# Patient Record
Sex: FEMALE | Race: BLACK OR AFRICAN AMERICAN | Employment: UNEMPLOYED | URBAN - METROPOLITAN AREA
[De-identification: names, ages, dates, MRNs, and addresses within clinical notes are randomized per-mention and may not be internally consistent; named-entity substitution may affect disease eponyms.]

---

## 2018-03-11 ENCOUNTER — APPOINTMENT (OUTPATIENT)
Dept: CT IMAGING | Age: 58
End: 2018-03-11
Payer: COMMERCIAL

## 2018-03-11 ENCOUNTER — HOSPITAL ENCOUNTER (EMERGENCY)
Age: 58
Discharge: HOME | End: 2018-03-11
Attending: EMERGENCY MEDICINE
Payer: COMMERCIAL

## 2018-03-11 VITALS
WEIGHT: 186.5 LBS | RESPIRATION RATE: 14 BRPM | SYSTOLIC BLOOD PRESSURE: 134 MMHG | BODY MASS INDEX: 28.26 KG/M2 | HEIGHT: 68 IN | OXYGEN SATURATION: 100 % | DIASTOLIC BLOOD PRESSURE: 73 MMHG | HEART RATE: 70 BPM | TEMPERATURE: 97.6 F

## 2018-03-11 DIAGNOSIS — R47.9 SPEECH DISTURBANCE, UNSPECIFIED TYPE: ICD-10-CM

## 2018-03-11 DIAGNOSIS — R20.2 TINGLING SENSATION: Primary | ICD-10-CM

## 2018-03-11 LAB
% CKMB: 0.9 % (ref 0–3)
ABSOLUTE EOS #: 0.1 K/UL (ref 0–0.4)
ABSOLUTE IMMATURE GRANULOCYTE: ABNORMAL K/UL (ref 0–0.3)
ABSOLUTE LYMPH #: 1.8 K/UL (ref 1–4.8)
ABSOLUTE MONO #: 0.4 K/UL (ref 0.2–0.8)
ALBUMIN SERPL-MCNC: 4.2 G/DL (ref 3.5–5.2)
ALBUMIN/GLOBULIN RATIO: ABNORMAL (ref 1–2.5)
ALP BLD-CCNC: 92 U/L (ref 35–104)
ALT SERPL-CCNC: 17 U/L (ref 5–33)
AMMONIA: 44 UMOL/L (ref 11–51)
ANION GAP SERPL CALCULATED.3IONS-SCNC: 10 MMOL/L (ref 9–17)
AST SERPL-CCNC: 23 U/L
BASOPHILS # BLD: 0 % (ref 0–2)
BASOPHILS ABSOLUTE: 0 K/UL (ref 0–0.2)
BILIRUB SERPL-MCNC: <0.1 MG/DL (ref 0.3–1.2)
BILIRUBIN DIRECT: <0.08 MG/DL
BILIRUBIN, INDIRECT: ABNORMAL MG/DL (ref 0–1)
BUN BLDV-MCNC: 16 MG/DL (ref 6–20)
BUN/CREAT BLD: 17 (ref 9–20)
CALCIUM SERPL-MCNC: 9.9 MG/DL (ref 8.6–10.4)
CHLORIDE BLD-SCNC: 96 MMOL/L (ref 98–107)
CK MB: 2.2 NG/ML
CKMB INTERPRETATION: ABNORMAL
CO2: 29 MMOL/L (ref 20–31)
CREAT SERPL-MCNC: 0.94 MG/DL (ref 0.5–0.9)
DIFFERENTIAL TYPE: ABNORMAL
EOSINOPHILS RELATIVE PERCENT: 1 % (ref 1–4)
GFR AFRICAN AMERICAN: >60 ML/MIN
GFR NON-AFRICAN AMERICAN: >60 ML/MIN
GFR SERPL CREATININE-BSD FRML MDRD: ABNORMAL ML/MIN/{1.73_M2}
GFR SERPL CREATININE-BSD FRML MDRD: ABNORMAL ML/MIN/{1.73_M2}
GLOBULIN: ABNORMAL G/DL (ref 1.5–3.8)
GLUCOSE BLD-MCNC: 119 MG/DL (ref 70–99)
HCT VFR BLD CALC: 38.4 % (ref 36–46)
HEMOGLOBIN: 12.6 G/DL (ref 12–16)
IMMATURE GRANULOCYTES: ABNORMAL %
LYMPHOCYTES # BLD: 26 % (ref 24–44)
MAGNESIUM: 2 MG/DL (ref 1.6–2.6)
MCH RBC QN AUTO: 30.9 PG (ref 26–34)
MCHC RBC AUTO-ENTMCNC: 32.7 G/DL (ref 31–37)
MCV RBC AUTO: 94.4 FL (ref 80–100)
MONOCYTES # BLD: 5 % (ref 1–7)
MYOGLOBIN: 118 NG/ML (ref 25–58)
NRBC AUTOMATED: ABNORMAL PER 100 WBC
PDW BLD-RTO: 13.8 % (ref 11.5–14.5)
PLATELET # BLD: 210 K/UL (ref 130–400)
PLATELET ESTIMATE: ABNORMAL
PMV BLD AUTO: 9.3 FL (ref 6–12)
POTASSIUM SERPL-SCNC: 3.9 MMOL/L (ref 3.7–5.3)
RBC # BLD: 4.07 M/UL (ref 4–5.2)
RBC # BLD: ABNORMAL 10*6/UL
SEG NEUTROPHILS: 68 % (ref 36–66)
SEGMENTED NEUTROPHILS ABSOLUTE COUNT: 4.5 K/UL (ref 1.8–7.7)
SODIUM BLD-SCNC: 135 MMOL/L (ref 135–144)
TOTAL CK: 233 U/L (ref 26–192)
TOTAL PROTEIN: 7.5 G/DL (ref 6.4–8.3)
TROPONIN INTERP: ABNORMAL
TROPONIN T: <0.03 NG/ML
TSH SERPL DL<=0.05 MIU/L-ACNC: 1.06 MIU/L (ref 0.3–5)
WBC # BLD: 6.7 K/UL (ref 3.5–11)
WBC # BLD: ABNORMAL 10*3/UL

## 2018-03-11 PROCEDURE — 70450 CT HEAD/BRAIN W/O DYE: CPT

## 2018-03-11 PROCEDURE — 84443 ASSAY THYROID STIM HORMONE: CPT

## 2018-03-11 PROCEDURE — 84484 ASSAY OF TROPONIN QUANT: CPT

## 2018-03-11 PROCEDURE — 82140 ASSAY OF AMMONIA: CPT

## 2018-03-11 PROCEDURE — 99285 EMERGENCY DEPT VISIT HI MDM: CPT

## 2018-03-11 PROCEDURE — 80307 DRUG TEST PRSMV CHEM ANLYZR: CPT

## 2018-03-11 PROCEDURE — 80076 HEPATIC FUNCTION PANEL: CPT

## 2018-03-11 PROCEDURE — 83874 ASSAY OF MYOGLOBIN: CPT

## 2018-03-11 PROCEDURE — 83735 ASSAY OF MAGNESIUM: CPT

## 2018-03-11 PROCEDURE — 85025 COMPLETE CBC W/AUTO DIFF WBC: CPT

## 2018-03-11 PROCEDURE — 82553 CREATINE MB FRACTION: CPT

## 2018-03-11 PROCEDURE — 80048 BASIC METABOLIC PNL TOTAL CA: CPT

## 2018-03-11 PROCEDURE — 82550 ASSAY OF CK (CPK): CPT

## 2018-03-11 RX ORDER — CLONIDINE HYDROCHLORIDE 0.1 MG/1
0.1 TABLET ORAL 2 TIMES DAILY
COMMUNITY

## 2018-03-11 RX ORDER — MAGNESIUM 30 MG
30 TABLET ORAL 2 TIMES DAILY
COMMUNITY

## 2018-03-11 ASSESSMENT — ENCOUNTER SYMPTOMS
ABDOMINAL PAIN: 0
COLOR CHANGE: 0
FACIAL SWELLING: 0
EYE DISCHARGE: 0
DIARRHEA: 0
CONSTIPATION: 0
EYE REDNESS: 0
VOMITING: 0
SHORTNESS OF BREATH: 0
COUGH: 0

## 2018-03-11 NOTE — ED PROVIDER NOTES
[facility] ED  eMERGENCY dEPARTMENT eNCOUnter      Pt Name: Dar Brito  MRN: [id number]  [place] 1960  Date of evaluation: 3/11/2018  Provider: Ej Saldivar MD    [address] [place] [address]       Chief Complaint   Patient presents with    Speech Problem     on and off for over a week    Other     pressure  in ears, head, hot flashes         HISTORY OF PRESENT ILLNESS  (Location/Symptom, Timing/Onset, Context/Setting, Quality, Duration, Modifying Factors, Severity.)   Dar Brito is a 59 y.o. female who presents to the emergency department For several complaints. She states that she has had \"creepy crawlies\" on the sides of her head and sometimes she feels like she can't talk. She denies headache or chest pain or palpitations. No fever. The symptoms have been intermittent for the last month. No thoughts of harming herself. She feels like she has pressure years and saw her doctor twice about that issue. Nursing Notes were reviewed. ALLERGIES     Patient has no known allergies. CURRENT MEDICATIONS       Previous Medications    CLONIDINE (CATAPRES) 0.1 MG TABLET    Take 0.1 mg by mouth 2 times daily    NAPROXEN (NAPROSYN) 500 MG TABLET    Take 1 tablet by mouth 2 times daily as needed for Pain       PAST MEDICAL HISTORY         Diagnosis Date    Back pain     Bowel obstruction        SURGICAL HISTORY           Procedure Laterality Date    HAND SURGERY Left 01/2017    PARATHYROIDECTOMY      SPINE SURGERY      BENIGNO AND BSO Left     THYROIDECTOMY, PARTIAL           FAMILY HISTORY     History reviewed. No pertinent family history. No family status information on file. SOCIAL HISTORY      reports that she has never smoked. She has never used smokeless tobacco. She reports that she does not drink alcohol or use drugs. REVIEW OF SYSTEMS    (2-9 systems for level 4, 10 or more for level 5)     Review of Systems   Constitutional: Negative for chills, fatigue and fever. HENT: Positive for ear pain. Negative for congestion, ear discharge and facial swelling. Eyes: Negative for discharge and redness. Respiratory: Negative for cough and shortness of breath. Cardiovascular: Negative for chest pain. Gastrointestinal: Negative for abdominal pain, constipation, diarrhea and vomiting. Genitourinary: Negative for dysuria and hematuria. Musculoskeletal: Negative for arthralgias. Skin: Negative for color change and rash. Neurological: Positive for headaches. Negative for dizziness, tremors, seizures, syncope, weakness and numbness. Hematological: Negative for adenopathy. Psychiatric/Behavioral: Negative for confusion. The patient is not nervous/anxious. Except as noted above the remainder of the review of systems was reviewed and negative. PHYSICAL EXAM    (up to 7 for level 4, 8 or more for level 5)     Vitals:    03/11/18 1711   BP: 133/70   Pulse: 93   Resp: 16   Temp: 97.6 °F (36.4 °C)   TempSrc: Oral   SpO2: 100%   Weight: 186 lb 8 oz (84.6 kg)   Height: 5' 8\" (1.727 m)       Physical Exam   Constitutional: She is oriented to person, place, and time. She appears well-developed and well-nourished. No distress. HENT:   Head: Normocephalic and atraumatic. Eyes: Right eye exhibits no discharge. Left eye exhibits no discharge. No scleral icterus. Neck: Neck supple. Cardiovascular: Normal rate and regular rhythm. Pulmonary/Chest: Effort normal and breath sounds normal. No stridor. No respiratory distress. She has no wheezes. She has no rales. Abdominal: Soft. She exhibits no distension. There is no tenderness. Musculoskeletal: Normal range of motion. Lymphadenopathy:     She has no cervical adenopathy. Neurological: She is alert and oriented to person, place, and time. Skin: Skin is warm and dry. No rash noted. She is not diaphoretic. No erythema. Psychiatric: She has a normal mood and affect.  Her behavior is normal.   Vitals

## 2018-03-11 NOTE — ED TRIAGE NOTES
Here today with pain that started in her right ear. Noticed a lot of pressure in her lower face and jaw. \"There's just some words that I cannot pronounce correctly\". Headaches and posterior neck pain. \"My floaters in my eyes have gotten worse over the last week\". \"Creepy crawling sensation to right side of face\" noted per pt. No neuro deficits noted- tongue midline upon protrusion, no arm drift noted, strong equal hand grasps and DPF. Speech clear, but pt feels speech is different to her at times. \"Jusat certain words I can't say\".

## 2018-03-15 ENCOUNTER — OFFICE VISIT (OUTPATIENT)
Dept: FAMILY MEDICINE CLINIC | Age: 58
End: 2018-03-15
Payer: COMMERCIAL

## 2018-03-15 VITALS
OXYGEN SATURATION: 93 % | BODY MASS INDEX: 28.46 KG/M2 | WEIGHT: 187.8 LBS | SYSTOLIC BLOOD PRESSURE: 124 MMHG | DIASTOLIC BLOOD PRESSURE: 74 MMHG | TEMPERATURE: 98.5 F | HEART RATE: 92 BPM | RESPIRATION RATE: 16 BRPM | HEIGHT: 68 IN

## 2018-03-15 DIAGNOSIS — M54.2 NECK PAIN OF OVER 3 MONTHS DURATION: Primary | ICD-10-CM

## 2018-03-15 PROCEDURE — G8484 FLU IMMUNIZE NO ADMIN: HCPCS | Performed by: FAMILY MEDICINE

## 2018-03-15 PROCEDURE — G8419 CALC BMI OUT NRM PARAM NOF/U: HCPCS | Performed by: FAMILY MEDICINE

## 2018-03-15 PROCEDURE — 99203 OFFICE O/P NEW LOW 30 MIN: CPT | Performed by: FAMILY MEDICINE

## 2018-03-15 PROCEDURE — G8427 DOCREV CUR MEDS BY ELIG CLIN: HCPCS | Performed by: FAMILY MEDICINE

## 2018-03-15 PROCEDURE — 1036F TOBACCO NON-USER: CPT | Performed by: FAMILY MEDICINE

## 2018-03-15 PROCEDURE — 3017F COLORECTAL CA SCREEN DOC REV: CPT | Performed by: FAMILY MEDICINE

## 2018-03-15 PROCEDURE — G0444 DEPRESSION SCREEN ANNUAL: HCPCS | Performed by: FAMILY MEDICINE

## 2018-03-15 PROCEDURE — 3014F SCREEN MAMMO DOC REV: CPT | Performed by: FAMILY MEDICINE

## 2018-03-15 ASSESSMENT — PATIENT HEALTH QUESTIONNAIRE - PHQ9
4. FEELING TIRED OR HAVING LITTLE ENERGY: 0
5. POOR APPETITE OR OVEREATING: 0
SUM OF ALL RESPONSES TO PHQ QUESTIONS 1-9: 11
6. FEELING BAD ABOUT YOURSELF - OR THAT YOU ARE A FAILURE OR HAVE LET YOURSELF OR YOUR FAMILY DOWN: 3
7. TROUBLE CONCENTRATING ON THINGS, SUCH AS READING THE NEWSPAPER OR WATCHING TELEVISION: 2
8. MOVING OR SPEAKING SO SLOWLY THAT OTHER PEOPLE COULD HAVE NOTICED. OR THE OPPOSITE, BEING SO FIGETY OR RESTLESS THAT YOU HAVE BEEN MOVING AROUND A LOT MORE THAN USUAL: 0
3. TROUBLE FALLING OR STAYING ASLEEP: 3
10. IF YOU CHECKED OFF ANY PROBLEMS, HOW DIFFICULT HAVE THESE PROBLEMS MADE IT FOR YOU TO DO YOUR WORK, TAKE CARE OF THINGS AT HOME, OR GET ALONG WITH OTHER PEOPLE: 0
9. THOUGHTS THAT YOU WOULD BE BETTER OFF DEAD, OR OF HURTING YOURSELF: 0
1. LITTLE INTEREST OR PLEASURE IN DOING THINGS: 0
SUM OF ALL RESPONSES TO PHQ9 QUESTIONS 1 & 2: 3
2. FEELING DOWN, DEPRESSED OR HOPELESS: 3

## 2018-03-15 NOTE — PROGRESS NOTES
Subjective:       Beth Triana is a 59 y.o. female and is here for a comprehensive physical exam.  The patient reports problems - : This is a 13-year-old female who is coming in and sitting there at chair very fatigued. She tells me that she has a lot of problems and she doesn't know which is start. Has problems neck and back and more anxiety sleep problems. Neck pain and back pain: for years. The recent pain started in her ear, pressure all in her jw area and has problems prouncing words tingling but it feels that this is all in her neck. Then the ED doc says to her it is all in her head. Is just hard to descripe and all pressure in her neck and all neck problems and her R arm hurts and her R hand is numb and she fells like she falls apart. She was in [facility] all last year. When coming back her doc was in [place] and she was cut off all her meds and since then she fells \"awful\". She had a doc in [place] and then she was called and then she was discharged. But everybody tells her it is in her head. She tries to avoid the pain management spec and she needs anxiety meds to make her sleep she had not slept in 2-3 night. Now she took 2 [place] and she only slept for 2 hrs. Was on xanax and valium before. She tried gabapentin and Cymbalta and lyrica and gabapentin. She took trazodone but did not tolerate it. Feels since being back in [place]. She feels like \" somebody is going to get her\". Feels very depressed. It is like a \"cross road\". \"it was the biggest mistake to come back here. This place will never going to change, you are stuck here. It is awful. \"    Review of Systems  Do you have pain that bothers you in your daily life? yes  Review of Systems   Constitutional: Negative for fever and unexpected weight change.      Objective:   HENT:   /74   Pulse 92   Temp 98.5 °F (36.9 °C)   Resp 16   Ht 5' 8\" (1.727 m)   Wt 187 lb 12.8 oz (85.2 kg)   SpO2 93%   BMI 28.55 kg/m²   Constitutional: Alert and oriented. Well-nourished. Head: Normocephalic and atraumatic. [place] was seen today for new patient. Diagnoses and all orders for this visit:    Neck pain of over 3 months duration  -     XR CERVICAL SPINE (2-3 VIEWS); Future    Other orders  -     Cancel: CBC Auto Differential; Future  -     Cancel: Comprehensive Metabolic Panel; Future  -     Cancel: Urinalysis; Future  -     Cancel: TSH without Reflex; Future  -     Cancel: T4, Free; Future  -     Cancel: Lipid Panel; Future    Patient was seen with total face to face time of 30 minutes. More than 50% of this visit was counseling and education regarding pain medication sleep medication. I discussed with her in length that I won't prescribe any benzodiazepine or any pain medications or her. She needs to see pain management. She needs to see psychiatry. And then again patient states that she needs to have something  to sleep and she has all this pain and she goes on and goes on. That she is depressed, but she does not feel suicidal.  And then leaving the office. She just walks out and seems quite happy. Cussed with her can prescribe amitriptyline and trazodone. Seroquel for sleep. I won't prescribe anything else. Recommended for her to find a different physician who can prescribe all the meds she needs. Follow up as needed for acute illness      Call or return to clinic prn if these symptoms worsen or fail to improve as anticipated. I have reviewed the instructions with the patient, answering all questions to her satisfaction.       (Please note that portions of this note were completed with a voice recognition program. Efforts were made to edit the dictations but occasionally words are mis-transcribed.)

## 2018-06-18 ENCOUNTER — OFFICE VISIT (OUTPATIENT)
Dept: FAMILY MEDICINE CLINIC | Age: 58
End: 2018-06-18
Payer: COMMERCIAL

## 2018-06-18 ENCOUNTER — HOSPITAL ENCOUNTER (EMERGENCY)
Age: 58
Discharge: HOME | End: 2018-06-18
Attending: EMERGENCY MEDICINE
Payer: COMMERCIAL

## 2018-06-18 VITALS
HEART RATE: 99 BPM | DIASTOLIC BLOOD PRESSURE: 85 MMHG | RESPIRATION RATE: 16 BRPM | SYSTOLIC BLOOD PRESSURE: 141 MMHG | OXYGEN SATURATION: 97 %

## 2018-06-18 VITALS
OXYGEN SATURATION: 100 % | SYSTOLIC BLOOD PRESSURE: 144 MMHG | TEMPERATURE: 98.2 F | HEIGHT: 68 IN | RESPIRATION RATE: 18 BRPM | DIASTOLIC BLOOD PRESSURE: 84 MMHG | BODY MASS INDEX: 27.28 KG/M2 | HEART RATE: 97 BPM | WEIGHT: 180 LBS

## 2018-06-18 DIAGNOSIS — R20.2 NUMBNESS AND TINGLING: Primary | ICD-10-CM

## 2018-06-18 DIAGNOSIS — R20.0 NUMBNESS AND TINGLING: Primary | ICD-10-CM

## 2018-06-18 DIAGNOSIS — M79.2 NEUROPATHIC PAIN: Primary | ICD-10-CM

## 2018-06-18 LAB
ABSOLUTE EOS #: 0 K/UL (ref 0–0.4)
ABSOLUTE IMMATURE GRANULOCYTE: ABNORMAL K/UL (ref 0–0.3)
ABSOLUTE LYMPH #: 2.3 K/UL (ref 1–4.8)
ABSOLUTE MONO #: 0.5 K/UL (ref 0.2–0.8)
ANION GAP SERPL CALCULATED.3IONS-SCNC: 14 MMOL/L (ref 9–17)
BASOPHILS # BLD: 0 % (ref 0–2)
BASOPHILS ABSOLUTE: 0 K/UL (ref 0–0.2)
BUN BLDV-MCNC: 15 MG/DL (ref 6–20)
BUN/CREAT BLD: 18 (ref 9–20)
CALCIUM SERPL-MCNC: 10.1 MG/DL (ref 8.6–10.4)
CHLORIDE BLD-SCNC: 100 MMOL/L (ref 98–107)
CO2: 26 MMOL/L (ref 20–31)
CREAT SERPL-MCNC: 0.84 MG/DL (ref 0.5–0.9)
DIFFERENTIAL TYPE: ABNORMAL
EOSINOPHILS RELATIVE PERCENT: 1 % (ref 1–4)
GFR AFRICAN AMERICAN: >60 ML/MIN
GFR NON-AFRICAN AMERICAN: >60 ML/MIN
GFR SERPL CREATININE-BSD FRML MDRD: NORMAL ML/MIN/{1.73_M2}
GFR SERPL CREATININE-BSD FRML MDRD: NORMAL ML/MIN/{1.73_M2}
GLUCOSE BLD-MCNC: 94 MG/DL (ref 70–99)
HCT VFR BLD CALC: 37.8 % (ref 36–46)
HEMOGLOBIN: 12.1 G/DL (ref 12–16)
IMMATURE GRANULOCYTES: ABNORMAL %
LYMPHOCYTES # BLD: 29 % (ref 24–44)
MAGNESIUM: 1.9 MG/DL (ref 1.6–2.6)
MCH RBC QN AUTO: 30.4 PG (ref 26–34)
MCHC RBC AUTO-ENTMCNC: 32.1 G/DL (ref 31–37)
MCV RBC AUTO: 94.7 FL (ref 80–100)
MONOCYTES # BLD: 6 % (ref 1–7)
NRBC AUTOMATED: ABNORMAL PER 100 WBC
PDW BLD-RTO: 14.8 % (ref 11.5–14.5)
PLATELET # BLD: 190 K/UL (ref 130–400)
PLATELET ESTIMATE: ABNORMAL
PMV BLD AUTO: 9.5 FL (ref 6–12)
POTASSIUM SERPL-SCNC: 3.9 MMOL/L (ref 3.7–5.3)
RBC # BLD: 3.99 M/UL (ref 4–5.2)
RBC # BLD: ABNORMAL 10*6/UL
SEG NEUTROPHILS: 64 % (ref 36–66)
SEGMENTED NEUTROPHILS ABSOLUTE COUNT: 5 K/UL (ref 1.8–7.7)
SODIUM BLD-SCNC: 140 MMOL/L (ref 135–144)
WBC # BLD: 7.9 K/UL (ref 3.5–11)
WBC # BLD: ABNORMAL 10*3/UL

## 2018-06-18 PROCEDURE — 85025 COMPLETE CBC W/AUTO DIFF WBC: CPT

## 2018-06-18 PROCEDURE — 83735 ASSAY OF MAGNESIUM: CPT

## 2018-06-18 PROCEDURE — 80048 BASIC METABOLIC PNL TOTAL CA: CPT

## 2018-06-18 PROCEDURE — 99284 EMERGENCY DEPT VISIT MOD MDM: CPT

## 2018-06-18 PROCEDURE — 99212 OFFICE O/P EST SF 10 MIN: CPT | Performed by: FAMILY MEDICINE

## 2018-06-18 RX ORDER — FLUOXETINE HYDROCHLORIDE 20 MG/1
40 CAPSULE ORAL DAILY
COMMUNITY
End: 2018-08-21 | Stop reason: SDUPTHER

## 2018-06-18 RX ORDER — GABAPENTIN 300 MG/1
300 CAPSULE ORAL 2 TIMES DAILY
Qty: 20 CAPSULE | Refills: 0 | Status: SHIPPED | OUTPATIENT
Start: 2018-06-18 | End: 2019-04-17

## 2018-06-18 ASSESSMENT — PAIN SCALES - GENERAL: PAINLEVEL_OUTOF10: 5

## 2018-06-18 ASSESSMENT — PAIN DESCRIPTION - LOCATION: LOCATION: NECK

## 2018-07-26 ENCOUNTER — HOSPITAL ENCOUNTER (OUTPATIENT)
Dept: CT IMAGING | Age: 58
Discharge: HOME | End: 2018-07-28
Payer: COMMERCIAL

## 2018-07-26 DIAGNOSIS — M54.2 CERVICALGIA: ICD-10-CM

## 2018-07-26 PROCEDURE — 6360000004 HC RX CONTRAST MEDICATION: Performed by: FAMILY MEDICINE

## 2018-07-26 PROCEDURE — 70491 CT SOFT TISSUE NECK W/DYE: CPT

## 2018-07-26 PROCEDURE — 2580000003 HC RX 258: Performed by: FAMILY MEDICINE

## 2018-07-26 RX ORDER — 0.9 % SODIUM CHLORIDE 0.9 %
50 INTRAVENOUS SOLUTION INTRAVENOUS ONCE
Status: COMPLETED | OUTPATIENT
Start: 2018-07-26 | End: 2018-07-26

## 2018-07-26 RX ORDER — SODIUM CHLORIDE 0.9 % (FLUSH) 0.9 %
10 SYRINGE (ML) INJECTION PRN
Status: DISCONTINUED | OUTPATIENT
Start: 2018-07-26 | End: 2018-07-29 | Stop reason: HOSPADM

## 2018-07-26 RX ADMIN — IOPAMIDOL 75 ML: 755 INJECTION, SOLUTION INTRAVENOUS at 16:43

## 2018-07-26 RX ADMIN — SODIUM CHLORIDE 50 ML: 9 INJECTION, SOLUTION INTRAVENOUS at 16:44

## 2018-07-26 RX ADMIN — Medication 10 ML: at 16:44

## 2018-08-21 ENCOUNTER — OFFICE VISIT (OUTPATIENT)
Dept: FAMILY MEDICINE CLINIC | Age: 58
End: 2018-08-21
Payer: COMMERCIAL

## 2018-08-21 VITALS
OXYGEN SATURATION: 99 % | SYSTOLIC BLOOD PRESSURE: 140 MMHG | RESPIRATION RATE: 16 BRPM | HEART RATE: 89 BPM | DIASTOLIC BLOOD PRESSURE: 92 MMHG

## 2018-08-21 DIAGNOSIS — G89.4 CHRONIC PAIN SYNDROME: Primary | ICD-10-CM

## 2018-08-21 DIAGNOSIS — R93.7 ABNORMAL CT SCAN, CERVICAL SPINE: ICD-10-CM

## 2018-08-21 PROCEDURE — 1036F TOBACCO NON-USER: CPT | Performed by: FAMILY MEDICINE

## 2018-08-21 PROCEDURE — G8419 CALC BMI OUT NRM PARAM NOF/U: HCPCS | Performed by: FAMILY MEDICINE

## 2018-08-21 PROCEDURE — 3017F COLORECTAL CA SCREEN DOC REV: CPT | Performed by: FAMILY MEDICINE

## 2018-08-21 PROCEDURE — G8427 DOCREV CUR MEDS BY ELIG CLIN: HCPCS | Performed by: FAMILY MEDICINE

## 2018-08-21 PROCEDURE — 99213 OFFICE O/P EST LOW 20 MIN: CPT | Performed by: FAMILY MEDICINE

## 2018-08-21 RX ORDER — FLUOXETINE HYDROCHLORIDE 20 MG/1
40 CAPSULE ORAL DAILY
Qty: 30 CAPSULE | Refills: 3 | Status: SHIPPED | OUTPATIENT
Start: 2018-08-21 | End: 2018-11-29 | Stop reason: ALTCHOICE
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
## 2018-09-27 ENCOUNTER — HOSPITAL ENCOUNTER (OUTPATIENT)
Dept: MRI IMAGING | Age: 58
Discharge: HOME | End: 2018-09-29
Payer: COMMERCIAL

## 2018-09-27 DIAGNOSIS — H92.02 OTALGIA, LEFT EAR: ICD-10-CM

## 2018-09-27 DIAGNOSIS — M50.30 OTHER CERVICAL DISC DEGENERATION, UNSPECIFIED CERVICAL REGION: ICD-10-CM

## 2018-09-27 DIAGNOSIS — M54.2 CERVICALGIA: ICD-10-CM

## 2018-09-27 PROCEDURE — 72141 MRI NECK SPINE W/O DYE: CPT

## 2018-10-15 ENCOUNTER — OFFICE VISIT (OUTPATIENT)
Dept: FAMILY MEDICINE CLINIC | Age: 58
End: 2018-10-15
Payer: COMMERCIAL

## 2018-10-15 VITALS
TEMPERATURE: 97.2 F | WEIGHT: 180 LBS | DIASTOLIC BLOOD PRESSURE: 79 MMHG | HEIGHT: 68 IN | BODY MASS INDEX: 27.28 KG/M2 | HEART RATE: 80 BPM | RESPIRATION RATE: 16 BRPM | SYSTOLIC BLOOD PRESSURE: 128 MMHG

## 2018-10-15 DIAGNOSIS — M51.36 DEGENERATIVE DISC DISEASE, LUMBAR: ICD-10-CM

## 2018-10-15 DIAGNOSIS — R05.9 COUGH: Primary | ICD-10-CM

## 2018-10-15 DIAGNOSIS — J30.2 SEASONAL ALLERGIES: ICD-10-CM

## 2018-10-15 DIAGNOSIS — Z23 NEED FOR INFLUENZA VACCINATION: ICD-10-CM

## 2018-10-15 PROCEDURE — 3017F COLORECTAL CA SCREEN DOC REV: CPT | Performed by: FAMILY MEDICINE

## 2018-10-15 PROCEDURE — 1036F TOBACCO NON-USER: CPT | Performed by: FAMILY MEDICINE

## 2018-10-15 PROCEDURE — G8427 DOCREV CUR MEDS BY ELIG CLIN: HCPCS | Performed by: FAMILY MEDICINE

## 2018-10-15 PROCEDURE — G8482 FLU IMMUNIZE ORDER/ADMIN: HCPCS | Performed by: FAMILY MEDICINE

## 2018-10-15 PROCEDURE — G8419 CALC BMI OUT NRM PARAM NOF/U: HCPCS | Performed by: FAMILY MEDICINE

## 2018-10-15 PROCEDURE — 99214 OFFICE O/P EST MOD 30 MIN: CPT | Performed by: FAMILY MEDICINE

## 2018-10-15 RX ORDER — FLUTICASONE PROPIONATE 50 MCG
1 SPRAY, SUSPENSION (ML) NASAL DAILY
Qty: 1 BOTTLE | Refills: 3 | Status: SHIPPED
Start: 2018-10-15 | End: 2020-03-03 | Stop reason: ALTCHOICE

## 2018-10-15 RX ORDER — MONTELUKAST SODIUM 10 MG/1
10 TABLET ORAL NIGHTLY
Qty: 30 TABLET | Refills: 3 | Status: SHIPPED | OUTPATIENT
Start: 2018-10-15

## 2018-10-15 RX ORDER — LEVOCETIRIZINE DIHYDROCHLORIDE 5 MG/1
5 TABLET, FILM COATED ORAL NIGHTLY
Qty: 30 TABLET | Refills: 2 | Status: SHIPPED | OUTPATIENT
Start: 2018-10-15

## 2018-10-15 RX ORDER — ALBUTEROL SULFATE 90 UG/1
2 AEROSOL, METERED RESPIRATORY (INHALATION) EVERY 4 HOURS PRN
Qty: 1 INHALER | Refills: 0 | Status: SHIPPED | OUTPATIENT
Start: 2018-10-15 | End: 2021-04-22

## 2018-10-15 NOTE — PROGRESS NOTES
Subjective:       Anshul Elliott is a 59 y.o. female who presents for evaluation of nasal congestion and nonproductive cough. Symptoms began several weeks ago. Symptoms have been unchanged since that time. Past history is significant for allergies. The patient feels that all the nasal congestion. The cough is probably because of her allergies. And then again she starts to talk about her pain and her pain is so bad\". This is awful\". She has pain and nobody helps her. She also tells me that she had a granuloma in her left lung and she has increased pain there. The patient was referred to pain specialist just couple month ago and she did not see a back from him yet, so she wants to have another referral.  Again, she cannot believe that she has to suffer because of other people who missed use the pain medications. Past Medical History:   Diagnosis Date    Back pain     Bowel obstruction (HCC)     Depression      There are no active problems to display for this patient. Past Surgical History:   Procedure Laterality Date    HAND SURGERY Left 01/2017    PARATHYROIDECTOMY      SPINE SURGERY      BENIGNO AND BSO Left     THYROIDECTOMY, PARTIAL      WRIST FRACTURE SURGERY       No family history on file.   Social History     Social History    Marital status:      Spouse name: N/A    Number of children: N/A    Years of education: N/A     Social History Main Topics    Smoking status: Never Smoker    Smokeless tobacco: Never Used    Alcohol use No    Drug use: No    Sexual activity: Not Asked     Other Topics Concern    None     Social History Narrative    None     Current Outpatient Prescriptions   Medication Sig Dispense Refill    FLUoxetine (PROZAC) 20 MG capsule Take 2 capsules by mouth daily 30 capsule 3    cloNIDine (CATAPRES) 0.1 MG tablet Take 0.1 mg by mouth 2 times daily      magnesium 30 MG tablet Take 30 mg by mouth 2 times daily      naproxen (NAPROSYN) 500 MG tablet Take 1 tablet by mouth 2 times daily as needed for Pain 60 tablet 0    gabapentin (NEURONTIN) 300 MG capsule Take 1 capsule by mouth 2 times daily for 10 days. . 20 capsule 0     No current facility-administered medications for this visit. Current Outpatient Prescriptions on File Prior to Visit   Medication Sig Dispense Refill    FLUoxetine (PROZAC) 20 MG capsule Take 2 capsules by mouth daily 30 capsule 3    cloNIDine (CATAPRES) 0.1 MG tablet Take 0.1 mg by mouth 2 times daily      magnesium 30 MG tablet Take 30 mg by mouth 2 times daily      naproxen (NAPROSYN) 500 MG tablet Take 1 tablet by mouth 2 times daily as needed for Pain 60 tablet 0    gabapentin (NEURONTIN) 300 MG capsule Take 1 capsule by mouth 2 times daily for 10 days. . 20 capsule 0     No current facility-administered medications on file prior to visit. No Known Allergies    Review of Systems  Pertinent items are noted in HPI. Objective:      /79   Pulse 80   Temp 97.2 °F (36.2 °C) (Oral)   Resp 16   Ht 5' 8\" (1.727 m)   Wt 180 lb (81.6 kg)   BMI 27.37 kg/m²   General appearance: alert, appears stated age, cooperative and no distress  Lungs: clear to auscultation bilaterally  Heart: regular rate and rhythm, S1, S2 normal, no murmur, click, rub or gallop      Luke Mendoza was seen today for cough and wheezing. Diagnoses and all orders for this visit:    Cough  -     XR CHEST STANDARD (2 VW); Future  -     albuterol sulfate HFA (VENTOLIN HFA) 108 (90 Base) MCG/ACT inhaler; Inhale 2 puffs into the lungs every 4 hours as needed for Wheezing    Seasonal allergies  -     montelukast (SINGULAIR) 10 MG tablet; Take 1 tablet by mouth nightly  -     fluticasone (FLONASE) 50 MCG/ACT nasal spray; 1 spray by Each Nare route daily  -     levocetirizine (XYZAL) 5 MG tablet; Take 1 tablet by mouth nightly  -     albuterol sulfate HFA (VENTOLIN HFA) 108 (90 Base) MCG/ACT inhaler;  Inhale 2 puffs into the lungs every 4 hours as needed for

## 2018-10-16 PROCEDURE — 90686 IIV4 VACC NO PRSV 0.5 ML IM: CPT | Performed by: FAMILY MEDICINE

## 2018-10-16 PROCEDURE — 90471 IMMUNIZATION ADMIN: CPT | Performed by: FAMILY MEDICINE

## 2018-11-29 ENCOUNTER — HOSPITAL ENCOUNTER (OUTPATIENT)
Age: 58
Setting detail: SPECIMEN
Discharge: HOME | End: 2018-11-29
Payer: COMMERCIAL

## 2018-11-29 ENCOUNTER — OFFICE VISIT (OUTPATIENT)
Dept: FAMILY MEDICINE CLINIC | Age: 58
End: 2018-11-29
Payer: COMMERCIAL

## 2018-11-29 VITALS
WEIGHT: 186 LBS | SYSTOLIC BLOOD PRESSURE: 125 MMHG | TEMPERATURE: 97.7 F | HEART RATE: 97 BPM | DIASTOLIC BLOOD PRESSURE: 89 MMHG | OXYGEN SATURATION: 99 % | BODY MASS INDEX: 28.28 KG/M2

## 2018-11-29 DIAGNOSIS — R30.0 DYSURIA: ICD-10-CM

## 2018-11-29 DIAGNOSIS — M25.561 RECURRENT PAIN OF RIGHT KNEE: Primary | ICD-10-CM

## 2018-11-29 DIAGNOSIS — R45.89 DEPRESSED MOOD: ICD-10-CM

## 2018-11-29 LAB
BILIRUBIN, POC: ABNORMAL
BLOOD URINE, POC: ABNORMAL
CLARITY, POC: ABNORMAL
COLOR, POC: YELLOW
GLUCOSE URINE, POC: ABNORMAL
KETONES, POC: ABNORMAL
LEUKOCYTE EST, POC: ABNORMAL
NITRITE, POC: ABNORMAL
PH, POC: 7
PROTEIN, POC: ABNORMAL
SPECIFIC GRAVITY, POC: 1.01
UROBILINOGEN, POC: 0.2

## 2018-11-29 PROCEDURE — G8427 DOCREV CUR MEDS BY ELIG CLIN: HCPCS | Performed by: FAMILY MEDICINE

## 2018-11-29 PROCEDURE — G8482 FLU IMMUNIZE ORDER/ADMIN: HCPCS | Performed by: FAMILY MEDICINE

## 2018-11-29 PROCEDURE — G8419 CALC BMI OUT NRM PARAM NOF/U: HCPCS | Performed by: FAMILY MEDICINE

## 2018-11-29 PROCEDURE — 1036F TOBACCO NON-USER: CPT | Performed by: FAMILY MEDICINE

## 2018-11-29 PROCEDURE — 3017F COLORECTAL CA SCREEN DOC REV: CPT | Performed by: FAMILY MEDICINE

## 2018-11-29 PROCEDURE — 81002 URINALYSIS NONAUTO W/O SCOPE: CPT | Performed by: FAMILY MEDICINE

## 2018-11-29 PROCEDURE — 99214 OFFICE O/P EST MOD 30 MIN: CPT | Performed by: FAMILY MEDICINE

## 2018-11-29 RX ORDER — NITROFURANTOIN 25; 75 MG/1; MG/1
100 CAPSULE ORAL 2 TIMES DAILY
Qty: 10 CAPSULE | Refills: 0 | Status: SHIPPED | OUTPATIENT
Start: 2018-11-29 | End: 2018-12-04

## 2018-11-29 RX ORDER — FLUVOXAMINE MALEATE 100 MG/1
100 CAPSULE, EXTENDED RELEASE ORAL NIGHTLY
Qty: 30 CAPSULE | Refills: 1 | Status: SHIPPED | OUTPATIENT
Start: 2018-11-29 | End: 2019-01-29 | Stop reason: SDUPTHER

## 2018-11-29 NOTE — PATIENT INSTRUCTIONS
extended-release capsule. Swallow it whole. You should not stop using fluvoxamine suddenly. Follow your doctor's instructions about tapering your dose. Store at room temperature away from moisture and heat. Keep the bottle tightly closed when not in use. What happens if I miss a dose? Take the missed dose as soon as you remember. Skip the missed dose if it is almost time for your next scheduled dose. Do not take extra medicine to make up the missed dose. What happens if I overdose? Seek emergency medical attention or call the Poison Help line at 1-621.829.8787. What should I avoid while taking fluvoxamine? Drinking alcohol can increase certain side effects of fluvoxamine. Ask your doctor before taking a nonsteroidal anti-inflammatory drug (NSAID) for pain, arthritis, fever, or swelling. This includes aspirin, ibuprofen (Advil, Motrin), naproxen (Aleve), celecoxib (Celebrex), diclofenac, indomethacin, meloxicam, and others. Using an NSAID with fluvoxamine may cause you to bruise or bleed easily. Fluvoxamine may impair your thinking or reactions. Avoid driving or operating machinery until you know how fluvoxamine will affect you. What are the possible side effects of fluvoxamine? Get emergency medical help if you have signs of an allergic reaction: skin rash, blisters, or hives; fever, joint pain; difficult breathing; swelling of your face, lips, tongue, or throat. Report any new or worsening symptoms to your doctor, such as: mood or behavior changes, anxiety, panic attacks, trouble sleeping, or if you feel impulsive, irritable, agitated, hostile, aggressive, restless, hyperactive (mentally or physically), more depressed, or have thoughts about suicide or hurting yourself.   Call your doctor at once if you have;  · anxiety, racing thoughts, risk-taking behavior, sleep problems (insomnia), feelings of extreme happiness or irritability;  · blurred vision, tunnel vision, eye pain or swelling, or seeing halos

## 2018-11-29 NOTE — LETTER
[facility]  [address] [address]  Phone: [phone]    Ramón Maloney MD        December 3, 2018     [address] [facility] [place] [facility]      Dear Rima Rehman:    Below are the results from your recent visit:    Resulted Orders   Urine Culture   Result Value Ref Range    Specimen Description . CLEAN CATCH URINE     Special Requests NOT REPORTED     Culture NO GROWTH     Status FINAL 11/30/2018      Normal - no UTI. If you have any questions or concerns, please don't hesitate to call.     Sincerely,        Ramón Maloney MD

## 2018-11-29 NOTE — PROGRESS NOTES
General FM note    Teri Sparrow is a 59 y.o. female who presents today for follow up on her  medical conditions as noted below. Teri Sparrow is c/o of   Chief Complaint   Patient presents with    Dysuria       There is no problem list on file for this patient. Past Medical History:   Diagnosis Date    Back pain     Bowel obstruction (HCC)     Depression       Past Surgical History:   Procedure Laterality Date    HAND SURGERY Left 01/2017    PARATHYROIDECTOMY      SPINE SURGERY      BENIGNO AND BSO Left     THYROIDECTOMY, PARTIAL      WRIST FRACTURE SURGERY       No family history on file. Current Outpatient Prescriptions   Medication Sig Dispense Refill    nitrofurantoin, macrocrystal-monohydrate, (MACROBID) 100 MG capsule Take 1 capsule by mouth 2 times daily for 5 days 10 capsule 0    fluvoxaMINE (LUVOX CR) 100 MG CP24 extended release capsule Take 100 mg by mouth nightly 30 capsule 1    montelukast (SINGULAIR) 10 MG tablet Take 1 tablet by mouth nightly 30 tablet 3    fluticasone (FLONASE) 50 MCG/ACT nasal spray 1 spray by Each Nare route daily 1 Bottle 3    levocetirizine (XYZAL) 5 MG tablet Take 1 tablet by mouth nightly 30 tablet 2    albuterol sulfate HFA (VENTOLIN HFA) 108 (90 Base) MCG/ACT inhaler Inhale 2 puffs into the lungs every 4 hours as needed for Wheezing 1 Inhaler 0    gabapentin (NEURONTIN) 300 MG capsule Take 1 capsule by mouth 2 times daily for 10 days. . 20 capsule 0    cloNIDine (CATAPRES) 0.1 MG tablet Take 0.1 mg by mouth 2 times daily      magnesium 30 MG tablet Take 30 mg by mouth 2 times daily      naproxen (NAPROSYN) 500 MG tablet Take 1 tablet by mouth 2 times daily as needed for Pain 60 tablet 0     No current facility-administered medications for this visit.       ALLERGIES:  No Known Allergies    Social History   Substance Use Topics    Smoking status: Never Smoker    Smokeless tobacco: Never Used    Alcohol use No      Body mass index is distress. Eyes: normal conjunctiva and lids. CAV: RRR, no RMG. No edema lower extremities. Pulmo: CTA bilateral, no CWR. Skin: no rashes, lesions or ulcers. Abdomen: Soft. Bowel sounds positive. Tenderness especially at the suprapubic area. I did not appreciate any flank pains. Right knee. Tenderness to lateral ligaments. Normal state range of motion. Musculoskeletal: normal gait. Nails: no clubbing or cyanosis. Psychiatric: alert and oriented to place, time and person. Normal mood and affect. Assessment:       Diagnosis Orders   1. Recurrent pain of right knee  XR KNEE LEFT (3 VIEWS)   2. Dysuria  Urine Culture    POCT Urinalysis Dipstick (No Micro)   3. Depressed mood         Plan:   X-ray done. Patient does not want to see physical therapy right now. macrobid called in. Due to her symptoms. Await culture. The such a patient from Prozac to Luvox. Hopefully this will help her. Call or return to clinic prn if these symptoms worsen or fail to improve as anticipated. I have reviewed the instructions with the patient, answering all questions to her satisfaction. Return in about 4 weeks (around 12/27/2018), or if symptoms worsen or fail to improve, for Anxiety, MD.  Orders Placed This Encounter   Procedures    Urine Culture     Standing Status:   Future     Standing Expiration Date:   11/29/2019     Order Specific Question:   Specify (ex-cath, midstream, cysto, etc)?      Answer:   midstream    XR KNEE LEFT (3 VIEWS)     Standing Status:   Future     Standing Expiration Date:   11/29/2019    POCT Urinalysis Dipstick (No Micro)     Orders Placed This Encounter   Medications    nitrofurantoin, macrocrystal-monohydrate, (MACROBID) 100 MG capsule     Sig: Take 1 capsule by mouth 2 times daily for 5 days     Dispense:  10 capsule     Refill:  0    fluvoxaMINE (LUVOX CR) 100 MG CP24 extended release capsule     Sig: Take 100 mg by mouth nightly     Dispense:  30 capsule     Refill:  1

## 2018-11-30 LAB
CULTURE: NO GROWTH
Lab: NORMAL
SPECIMEN DESCRIPTION: NORMAL
STATUS: NORMAL

## 2018-12-19 ENCOUNTER — TELEPHONE (OUTPATIENT)
Dept: FAMILY MEDICINE CLINIC | Age: 58
End: 2018-12-19

## 2018-12-19 DIAGNOSIS — R05.9 COUGH: ICD-10-CM

## 2018-12-20 ENCOUNTER — OFFICE VISIT (OUTPATIENT)
Dept: FAMILY MEDICINE CLINIC | Age: 58
End: 2018-12-20
Payer: COMMERCIAL

## 2018-12-20 VITALS
OXYGEN SATURATION: 91 % | DIASTOLIC BLOOD PRESSURE: 80 MMHG | TEMPERATURE: 97.9 F | HEART RATE: 100 BPM | SYSTOLIC BLOOD PRESSURE: 136 MMHG

## 2018-12-20 DIAGNOSIS — J98.4 SCARRING OF LUNG: Primary | ICD-10-CM

## 2018-12-20 DIAGNOSIS — R05.3 PERSISTENT COUGH FOR 3 WEEKS OR LONGER: ICD-10-CM

## 2018-12-20 PROCEDURE — 3017F COLORECTAL CA SCREEN DOC REV: CPT | Performed by: FAMILY MEDICINE

## 2018-12-20 PROCEDURE — G8419 CALC BMI OUT NRM PARAM NOF/U: HCPCS | Performed by: FAMILY MEDICINE

## 2018-12-20 PROCEDURE — G8427 DOCREV CUR MEDS BY ELIG CLIN: HCPCS | Performed by: FAMILY MEDICINE

## 2018-12-20 PROCEDURE — 99213 OFFICE O/P EST LOW 20 MIN: CPT | Performed by: FAMILY MEDICINE

## 2018-12-20 PROCEDURE — G8482 FLU IMMUNIZE ORDER/ADMIN: HCPCS | Performed by: FAMILY MEDICINE

## 2018-12-20 PROCEDURE — 1036F TOBACCO NON-USER: CPT | Performed by: FAMILY MEDICINE

## 2018-12-20 RX ORDER — PREDNISONE 20 MG/1
20 TABLET ORAL DAILY
Qty: 5 TABLET | Refills: 0 | Status: SHIPPED | OUTPATIENT
Start: 2018-12-20 | End: 2018-12-25

## 2018-12-20 RX ORDER — CLARITHROMYCIN 500 MG/1
500 TABLET, COATED ORAL 2 TIMES DAILY
Qty: 20 TABLET | Refills: 0 | Status: SHIPPED | OUTPATIENT
Start: 2018-12-20 | End: 2018-12-30

## 2018-12-20 ASSESSMENT — PATIENT HEALTH QUESTIONNAIRE - PHQ9
1. LITTLE INTEREST OR PLEASURE IN DOING THINGS: 1
SUM OF ALL RESPONSES TO PHQ QUESTIONS 1-9: 2
2. FEELING DOWN, DEPRESSED OR HOPELESS: 1
SUM OF ALL RESPONSES TO PHQ9 QUESTIONS 1 & 2: 2
SUM OF ALL RESPONSES TO PHQ QUESTIONS 1-9: 2

## 2019-01-29 RX ORDER — FLUVOXAMINE MALEATE 100 MG/1
100 CAPSULE, EXTENDED RELEASE ORAL NIGHTLY
Qty: 30 CAPSULE | Refills: 1 | Status: SHIPPED | OUTPATIENT
Start: 2019-01-29 | End: 2019-04-01 | Stop reason: SDUPTHER

## 2019-02-06 ENCOUNTER — OFFICE VISIT (OUTPATIENT)
Dept: PULMONOLOGY | Age: 59
End: 2019-02-06
Payer: COMMERCIAL

## 2019-02-06 VITALS
HEIGHT: 68 IN | BODY MASS INDEX: 29.7 KG/M2 | SYSTOLIC BLOOD PRESSURE: 145 MMHG | OXYGEN SATURATION: 99 % | RESPIRATION RATE: 20 BRPM | WEIGHT: 196 LBS | DIASTOLIC BLOOD PRESSURE: 76 MMHG | HEART RATE: 93 BPM

## 2019-02-06 VITALS — HEART RATE: 93 BPM | WEIGHT: 196 LBS | OXYGEN SATURATION: 99 % | BODY MASS INDEX: 29.7 KG/M2 | HEIGHT: 68 IN

## 2019-02-06 DIAGNOSIS — J98.4 SCARRING OF LUNG: Primary | ICD-10-CM

## 2019-02-06 DIAGNOSIS — R91.1 LUNG NODULE SEEN ON IMAGING STUDY: ICD-10-CM

## 2019-02-06 PROCEDURE — G8427 DOCREV CUR MEDS BY ELIG CLIN: HCPCS | Performed by: INTERNAL MEDICINE

## 2019-02-06 PROCEDURE — 94729 DIFFUSING CAPACITY: CPT | Performed by: INTERNAL MEDICINE

## 2019-02-06 PROCEDURE — G8482 FLU IMMUNIZE ORDER/ADMIN: HCPCS | Performed by: INTERNAL MEDICINE

## 2019-02-06 PROCEDURE — 3017F COLORECTAL CA SCREEN DOC REV: CPT | Performed by: INTERNAL MEDICINE

## 2019-02-06 PROCEDURE — 94726 PLETHYSMOGRAPHY LUNG VOLUMES: CPT | Performed by: INTERNAL MEDICINE

## 2019-02-06 PROCEDURE — 99243 OFF/OP CNSLTJ NEW/EST LOW 30: CPT | Performed by: INTERNAL MEDICINE

## 2019-02-06 PROCEDURE — G8419 CALC BMI OUT NRM PARAM NOF/U: HCPCS | Performed by: INTERNAL MEDICINE

## 2019-02-06 PROCEDURE — 94060 EVALUATION OF WHEEZING: CPT | Performed by: INTERNAL MEDICINE

## 2019-04-01 RX ORDER — FLUVOXAMINE MALEATE 100 MG/1
100 CAPSULE, EXTENDED RELEASE ORAL NIGHTLY
Qty: 30 CAPSULE | Refills: 1 | Status: SHIPPED | OUTPATIENT
Start: 2019-04-01 | End: 2019-04-02 | Stop reason: SDUPTHER

## 2019-04-02 ENCOUNTER — OFFICE VISIT (OUTPATIENT)
Dept: FAMILY MEDICINE CLINIC | Age: 59
End: 2019-04-02
Payer: COMMERCIAL

## 2019-04-02 VITALS
DIASTOLIC BLOOD PRESSURE: 71 MMHG | HEART RATE: 97 BPM | TEMPERATURE: 97.8 F | OXYGEN SATURATION: 93 % | SYSTOLIC BLOOD PRESSURE: 114 MMHG

## 2019-04-02 DIAGNOSIS — M54.50 ACUTE RIGHT-SIDED LOW BACK PAIN WITHOUT SCIATICA: Primary | ICD-10-CM

## 2019-04-02 DIAGNOSIS — N39.3 STRESS INCONTINENCE OF URINE: ICD-10-CM

## 2019-04-02 PROCEDURE — 1036F TOBACCO NON-USER: CPT | Performed by: FAMILY MEDICINE

## 2019-04-02 PROCEDURE — G8419 CALC BMI OUT NRM PARAM NOF/U: HCPCS | Performed by: FAMILY MEDICINE

## 2019-04-02 PROCEDURE — 3017F COLORECTAL CA SCREEN DOC REV: CPT | Performed by: FAMILY MEDICINE

## 2019-04-02 PROCEDURE — 99214 OFFICE O/P EST MOD 30 MIN: CPT | Performed by: FAMILY MEDICINE

## 2019-04-02 PROCEDURE — G8427 DOCREV CUR MEDS BY ELIG CLIN: HCPCS | Performed by: FAMILY MEDICINE

## 2019-04-02 RX ORDER — FLUVOXAMINE MALEATE 100 MG/1
100 CAPSULE, EXTENDED RELEASE ORAL NIGHTLY
Qty: 30 CAPSULE | Refills: 1 | Status: SHIPPED | OUTPATIENT
Start: 2019-04-02 | End: 2019-07-19 | Stop reason: SDUPTHER

## 2019-04-02 RX ORDER — OXYBUTYNIN CHLORIDE 10 MG/1
10 TABLET, EXTENDED RELEASE ORAL DAILY
Qty: 30 TABLET | Refills: 3 | Status: SHIPPED | OUTPATIENT
Start: 2019-04-02 | End: 2021-08-09

## 2019-04-02 RX ORDER — TIZANIDINE 4 MG/1
4 TABLET ORAL 3 TIMES DAILY
Qty: 30 TABLET | Refills: 0 | Status: SHIPPED | OUTPATIENT
Start: 2019-04-02

## 2019-04-02 RX ORDER — LANSOPRAZOLE 30 MG/1
30 CAPSULE, DELAYED RELEASE ORAL DAILY
Qty: 30 CAPSULE | Refills: 3 | Status: SHIPPED | OUTPATIENT
Start: 2019-04-02 | End: 2020-05-22

## 2019-04-02 ASSESSMENT — PATIENT HEALTH QUESTIONNAIRE - PHQ9
2. FEELING DOWN, DEPRESSED OR HOPELESS: 0
1. LITTLE INTEREST OR PLEASURE IN DOING THINGS: 0
SUM OF ALL RESPONSES TO PHQ QUESTIONS 1-9: 0
SUM OF ALL RESPONSES TO PHQ QUESTIONS 1-9: 0
SUM OF ALL RESPONSES TO PHQ9 QUESTIONS 1 & 2: 0

## 2019-04-02 NOTE — PROGRESS NOTES
General FM note    Abdirashid Colunga is a 59 y.o. female who presents today for follow up on her  medical conditions as noted below. Abdirashid Colunga is c/o of   Chief Complaint   Patient presents with    Back Pain       Patient Active Problem List:     Depressed mood     Scarring of lung     Past Medical History:   Diagnosis Date    Back pain     Bowel obstruction ([address]     Depression       Past Surgical History:   Procedure Laterality Date    HAND SURGERY Left 01/2017    PARATHYROIDECTOMY      SPINE SURGERY      BENIGNO AND BSO Left     THYROIDECTOMY, PARTIAL      WRIST FRACTURE SURGERY       No family history on file. Current Outpatient Medications   Medication Sig Dispense Refill    oxybutynin (DITROPAN XL) 10 MG extended release tablet Take 1 tablet by mouth daily 30 tablet 3    tiZANidine (ZANAFLEX) 4 MG tablet Take 1 tablet by mouth 3 times daily 30 tablet 0    lansoprazole (PREVACID) 30 MG delayed release capsule Take 1 capsule by mouth daily 30 capsule 3    fluvoxaMINE (LUVOX CR) 100 MG CP24 extended release capsule Take 100 mg by mouth nightly 30 capsule 1    montelukast (SINGULAIR) 10 MG tablet Take 1 tablet by mouth nightly 30 tablet 3    fluticasone (FLONASE) 50 MCG/ACT nasal spray 1 spray by Each Nare route daily 1 Bottle 3    levocetirizine (XYZAL) 5 MG tablet Take 1 tablet by mouth nightly 30 tablet 2    albuterol sulfate HFA (VENTOLIN HFA) 108 (90 Base) MCG/ACT inhaler Inhale 2 puffs into the lungs every 4 hours as needed for Wheezing 1 Inhaler 0    gabapentin (NEURONTIN) 300 MG capsule Take 1 capsule by mouth 2 times daily for 10 days. . 20 capsule 0    cloNIDine (CATAPRES) 0.1 MG tablet Take 0.1 mg by mouth 2 times daily      magnesium 30 MG tablet Take 30 mg by mouth 2 times daily      naproxen (NAPROSYN) 500 MG tablet Take 1 tablet by mouth 2 times daily as needed for Pain 60 tablet 0     No current facility-administered medications for this visit.       ALLERGIES:  No

## 2019-04-02 NOTE — PROGRESS NOTES
Visit Information    Have you changed or started any medications since your last visit including any over-the-counter medicines, vitamins, or herbal medicines? no   Are you having any side effects from any of your medications? -  no  Have you stopped taking any of your medications? Is so, why? -  no    Have you seen any other physician or provider since your last visit? No  Have you had any other diagnostic tests since your last visit? No  Have you been seen in the emergency room and/or had an admission to a hospital since we last saw you? No  Have you had your routine dental cleaning in the past 6 months? no    Have you activated your [facility] account? If not, what are your barriers?  No     Patient Care Team:  Rivera Amin MD as PCP - General (Family Medicine)  Rosemarie Bradford MD as Consulting Physician (Pulmonology)    Medical History Review  Past Medical, Family, and Social History reviewed and does not contribute to the patient presenting condition    Health Maintenance   Topic Date Due    Hepatitis C screen  1960    HIV screen  08/05/1973    DTaP/Tdap/Td vaccine (1 - Tdap) 08/05/1977    Lipid screen  08/05/1998    Breast cancer screen  08/05/2008    Shingles Vaccine (1 of 2) 08/05/2008    Colon cancer screen colonoscopy  08/05/2008    Flu vaccine  Completed

## 2019-04-17 ENCOUNTER — HOSPITAL ENCOUNTER (OUTPATIENT)
Dept: PAIN MANAGEMENT | Age: 59
Discharge: HOME | End: 2019-04-17
Payer: COMMERCIAL

## 2019-04-17 VITALS
HEART RATE: 77 BPM | DIASTOLIC BLOOD PRESSURE: 78 MMHG | TEMPERATURE: 98.7 F | BODY MASS INDEX: 29.07 KG/M2 | SYSTOLIC BLOOD PRESSURE: 125 MMHG | WEIGHT: 191.8 LBS | RESPIRATION RATE: 16 BRPM | HEIGHT: 68 IN | OXYGEN SATURATION: 98 %

## 2019-04-17 DIAGNOSIS — M51.36 DDD (DEGENERATIVE DISC DISEASE), LUMBAR: ICD-10-CM

## 2019-04-17 DIAGNOSIS — Z98.1 STATUS POST LUMBAR SPINAL FUSION: ICD-10-CM

## 2019-04-17 DIAGNOSIS — M47.817 LUMBOSACRAL SPONDYLOSIS WITHOUT MYELOPATHY: ICD-10-CM

## 2019-04-17 DIAGNOSIS — R45.89 DEPRESSED MOOD: Primary | ICD-10-CM

## 2019-04-17 PROCEDURE — 80307 DRUG TEST PRSMV CHEM ANLYZR: CPT

## 2019-04-17 PROCEDURE — 99203 OFFICE O/P NEW LOW 30 MIN: CPT

## 2019-04-17 ASSESSMENT — ENCOUNTER SYMPTOMS
EYE PAIN: 0
COUGH: 0
VOMITING: 0
ALLERGIC/IMMUNOLOGIC NEGATIVE: 1
RESPIRATORY NEGATIVE: 1
BACK PAIN: 1
CHOKING: 0
WHEEZING: 0
SINUS PRESSURE: 0
CONSTIPATION: 1
ABDOMINAL PAIN: 0
NAUSEA: 0
EYES NEGATIVE: 1
PHOTOPHOBIA: 0
BOWEL INCONTINENCE: 0

## 2019-04-17 ASSESSMENT — PAIN DESCRIPTION - ORIENTATION: ORIENTATION: LOWER

## 2019-04-17 ASSESSMENT — PAIN DESCRIPTION - PAIN TYPE: TYPE: CHRONIC PAIN

## 2019-04-17 ASSESSMENT — PAIN SCALES - GENERAL: PAINLEVEL_OUTOF10: 7

## 2019-04-17 ASSESSMENT — PAIN DESCRIPTION - DIRECTION: RADIATING_TOWARDS: NUMBNESS IN FEET

## 2019-04-17 ASSESSMENT — PAIN DESCRIPTION - ONSET: ONSET: ON-GOING

## 2019-04-17 ASSESSMENT — PAIN DESCRIPTION - FREQUENCY: FREQUENCY: CONTINUOUS

## 2019-04-17 ASSESSMENT — PAIN DESCRIPTION - PROGRESSION: CLINICAL_PROGRESSION: GRADUALLY WORSENING

## 2019-04-17 NOTE — PROGRESS NOTES
[facility] Pain Management  Patient Pain Assessment  Consultation - Dr. Yasmany Cannon    Primary Care Physician: Mariaa Rios MD    Chief complaint:   Chief Complaint   Patient presents with    Lower Back Pain   . HISTORY OF PRESENT ILLNESS:  Presley Sales is 59 y.o. female referred to the pain clinic in consultation for back pain by Bhavya Guan MD    Patient is a 78-year-old female who is referred to the pain clinic with a history of low back pain since 2004. Patient reports she needed 2 back surgeries in 2004 but continued to have pain. According to the patient her surgeon wanted to do another surgery but patient went to [facility] and was told not to have any further surgeries. Apparently she has been to [place] [facility]. She did physical therapy in the past. Patient is a poor historian and is very difficult to get a good history from her. She was apparently seen by different physicians and was getting oxycodone 30 mg 3 times a day. She went to [place] [id number] where she fell and fractured her left arm and had surgery done. Apparently she moved back to [place] 2 years ago. She has not been to any other physicians since then. She was referred to Dr. Galina Joseph as well as Dr. Thomas Hudson by her physician but the patient did not go. She reports she was not getting any pain medications for the last 2 years. Patient also gives history of depression. Her goal is to get better. Her pain is decreased to set next and with pain medications. Patient also gives history of for pain in the cervical region as well as in the shoulders and in the wrists. She also complains of numbness and tingling involving the hands and feet  Patient's pain in the low back also in the right knee and right calf. Back Pain   This is a chronic problem. The current episode started more than 1 year ago. The problem occurs constantly. The problem has been gradually worsening since onset.  The pain is present in the applicable  Taking more medication than prescribed:  not applicable  Are you receiving PAIN medications from  other doctors: not applicable  Last Urine/Serum Drug Screen :  Was Serum/UDS as anticipated? Will collect  Brought pill bottles in :not applicable   Was Pill count appropriate? :not applicable   Are currently pregnant? not applicable  Recent ER visits: No             Past Medical History      Diagnosis Date    Back pain     Bowel obstruction ([facility] [address]     Depression        Surgical History  Past Surgical History:   Procedure Laterality Date    HAND SURGERY Left 01/2017    PARATHYROIDECTOMY      SPINE SURGERY      BENIGNO AND BSO Left     THYROIDECTOMY, PARTIAL      WRIST FRACTURE SURGERY         Medications  Current Outpatient Medications   Medication Sig Dispense Refill    oxybutynin (DITROPAN XL) 10 MG extended release tablet Take 1 tablet by mouth daily 30 tablet 3    tiZANidine (ZANAFLEX) 4 MG tablet Take 1 tablet by mouth 3 times daily 30 tablet 0    lansoprazole (PREVACID) 30 MG delayed release capsule Take 1 capsule by mouth daily 30 capsule 3    fluvoxaMINE (LUVOX CR) 100 MG CP24 extended release capsule Take 100 mg by mouth nightly 30 capsule 1    montelukast (SINGULAIR) 10 MG tablet Take 1 tablet by mouth nightly 30 tablet 3    fluticasone (FLONASE) 50 MCG/ACT nasal spray 1 spray by Each Nare route daily 1 Bottle 3    albuterol sulfate HFA (VENTOLIN HFA) 108 (90 Base) MCG/ACT inhaler Inhale 2 puffs into the lungs every 4 hours as needed for Wheezing 1 Inhaler 0    cloNIDine (CATAPRES) 0.1 MG tablet Take 0.1 mg by mouth 2 times daily      levocetirizine (XYZAL) 5 MG tablet Take 1 tablet by mouth nightly 30 tablet 2    magnesium 30 MG tablet Take 30 mg by mouth 2 times daily       No current facility-administered medications for this encounter. Allergies  Patient has no known allergies. Family History  family history is not on file.     Social History  Social History Socioeconomic History    Marital status:      Spouse name: None    Number of children: None    Years of education: None    Highest education level: None   Occupational History    None   Social Needs    Financial resource strain: None    Food insecurity:     Worry: None     Inability: None    Transportation needs:     Medical: None     Non-medical: None   Tobacco Use    Smoking status: Never Smoker    Smokeless tobacco: Never Used   Substance and Sexual Activity    Alcohol use: No    Drug use: No    Sexual activity: None   Lifestyle    Physical activity:     Days per week: None     Minutes per session: None    Stress: None   Relationships    Social connections:     Talks on phone: None     Gets together: None     Attends Christian service: None     Active member of club or organization: None     Attends meetings of clubs or organizations: None     Relationship status: None    Intimate partner violence:     Fear of current or ex partner: None     Emotionally abused: None     Physically abused: None     Forced sexual activity: None   Other Topics Concern    None   Social History Narrative    None      reports that she does not use drugs. REVIEW OF SYSTEMS:  Review of Systems   Constitutional: Negative. Negative for activity change, appetite change, diaphoresis and fever. HENT: Negative. Negative for congestion, hearing loss, postnasal drip and sinus pressure. Eyes: Negative. Negative for photophobia, pain and visual disturbance. Respiratory: Negative. Negative for cough, choking and wheezing. Cardiovascular: Negative. Negative for chest pain and palpitations. Gastrointestinal: Positive for constipation. Negative for abdominal pain, bowel incontinence, nausea and vomiting. Endocrine: Negative. Negative for heat intolerance and polyphagia. Genitourinary: Negative. Negative for bladder incontinence, dysuria and hematuria.    Musculoskeletal: Positive for back pain and neck pain. Skin: Negative. Allergic/Immunologic: Negative. Neurological: Positive for weakness, numbness and headaches. Both knees   Hematological: Negative. Negative for adenopathy. Does not bruise/bleed easily. Psychiatric/Behavioral: Positive for sleep disturbance. Negative for confusion and suicidal ideas. The patient is nervous/anxious. Depression            GENERAL PHYSICAL EXAM:  Vitals: /78   Pulse 77   Temp 98.7 °F (37.1 °C) (Oral)   Resp 16   Ht 5' 8\" (1.727 m)   Wt 191 lb 12.8 oz (87 kg)   SpO2 98%   BMI 29.16 kg/m² , Body mass index is 29.16 kg/m². Physical Exam   Constitutional: She is oriented to person, place, and time. She appears well-developed and well-nourished. HENT:   Head: Normocephalic and atraumatic. Eyes: Pupils are equal, round, and reactive to light. Conjunctivae and EOM are normal.   Neck: Neck supple. No tracheal deviation present. No thyromegaly present. Cardiovascular: Normal rate and regular rhythm. Pulmonary/Chest: Effort normal.   Abdominal: She exhibits no distension. Musculoskeletal: Normal range of motion. Neurological: She is alert and oriented to person, place, and time. She has normal strength. She displays no atrophy, no tremor and normal reflexes. No cranial nerve deficit or sensory deficit. She displays a negative Romberg sign. Coordination normal.   Reflex Scores:       Patellar reflexes are 2+ on the right side and 2+ on the left side. Achilles reflexes are 2+ on the right side and 2+ on the left side. Skin: Skin is warm and dry. No erythema. Psychiatric: Her speech is normal and behavior is normal. Judgment and thought content normal. She exhibits a depressed mood. Right Ankle Exam     Muscle Strength   The patient has normal right ankle strength. Left Ankle Exam     Muscle Strength   The patient has normal left ankle strength.       Right Knee Exam     Muscle Strength   The patient has normal right knee strength. Left Knee Exam     Muscle Strength   The patient has normal left knee strength. Right Hip Exam     Muscle Strength   The patient has normal right hip strength. Left Hip Exam     Muscle Strength   The patient has normal left hip strength. Back Exam     Tenderness   The patient is experiencing tenderness in the lumbar and sacroiliac. Range of Motion   Back extension: Limited and painful. Back flexion: Limited and painful. Back lateral bend right: Limited and painful. Back lateral bend left: Limited and painful. Back rotation right: Limited and painful. Back rotation left: Limited and painful. Tests   Straight leg raise right: negative  Straight leg raise left: negative    Other   Sensation: normal  Gait: antalgic   Erythema: no back redness  Scars: present    Comments:   Facet loading---  : Right side--    Pain-Moderate                                   Left side----   Pain  Moderate  Sebastien's test -------------- Right side---negative                                           Left side-----negative          Nurses Notes and Vital Signs reviewed.     DATA  Labs:   6/18/2018  9:31 PM - Rolando Blair Incoming Lab Results From 2nd Watch     Component Value Ref Range & Units Status Collected Lab   Glucose 94  70 - 99 mg/dL Final 06/18/2018  8:51 PM - [facility]   BUN 15  6 - 20 mg/dL Final 06/18/2018  8:51  [facility]   CREATININE 0.84  0.50 - 0.90 mg/dL Final 06/18/2018  8:51  [facility]   Bun/Cre Ratio 18  9 - 20 Final 06/18/2018  8:51  [facility]   Calcium 10.1  8.6 - 10.4 mg/dL Final 06/18/2018  8:51  [facility]   Sodium 140  135 - 144 mmol/L Final 06/18/2018  8:51  [facility]   Potassium 3.9  3.7 - 5.3 mmol/L Final 06/18/2018  8:51  [facility]   Chloride 100  98 - 107 mmol/L Final 06/18/2018  8:51  [facility]   CO2 26  20 - 31 mmol/L Final 06/18/2018  8:51  [facility]   Anion Gap 14  9 - 17 mmol/L Final 06/18/2018  8:51  [facility]   GFR Non-African American >60  >60 mL/min Final 06/18/2018  8:51  [facility]   GFR African American >60  >60 mL/min Final 06/18/2018  8:51  [facility]   GFR Comment        Final 06/18/2018  8:51           Imaging:  Radiology Images and Reports reviewed where indicated and necessary     SPINAL CORD: No abnormal cord signal is noted.       SOFT TISSUES: No paraspinal mass identified.       C2-C3: Minimal disc bulging is noted.  Facet hypertrophic changes are noted,   right greater than left.  There is mild suggested foraminal narrowing on the   right.       C3-C4: Diffuse disc bulging is noted with diffuse endplate hypertrophic   change and bilateral facet hypertrophic change.  Moderate to severe foraminal   narrowing is noted, left greater than right.  Ventral cord flattening is   noted.  Mild narrowing of the canal is noted.       C4-C5: Diffuse disc bulging is noted.  Endplate, facet and uncovertebral   hypertrophic changes are noted.  Mild foraminal narrowing is noted   bilaterally.  Ventral cord flattening is noted.  Mild canal narrowing is noted       C5-C6: Diffuse disc bulging is noted.  Endplate, uncovertebral and facet   hypertrophic changes are noted.  Mild-to-moderate left and mild right   foraminal narrowing is noted.  Ventral cord flattening is noted.  There is   mild narrowing of the canal       C6-C7: Mild disc bulging is noted diffusely.  Endplate, uncovertebral and   facet hypertrophic changes are noted.  Moderate narrowing of the left foramen   is noted.  Left foraminal protrusion is noted.       C7-T1: Minimal disc bulging is noted.  Facet hypertrophic changes are noted.    Mild narrowing of the left foramen is suggested           Impression   Multilevel degenerative disc disease in the cervical spine as described.  See   above for details of each level       Patient Active Problem List   Diagnosis    Depressed mood    Scarring of lung        ASSESSMENT    Robert Hahn is a 59 y.o. female with     1. Depressed mood    2. DDD (degenerative disc disease), lumbar    3. Lumbosacral spondylosis without myelopathy    4. Status post lumbar spinal fusion           PLAN  . [x] No x-ray reports of lumbar spine are available at this time-patient apparently did not get her x-rays done yet    Other reports reviewed include    [] Bone scan   [] EMG and nerve conduction studies   [x] Referral reports-  I also discussed with him the following treatment options Including advantages and disadvantages of each:    [x] Physical therapy    [] Interventional pain treatment    [] Medication management    [] Surgical options    Patient's OARRS were reviewed. It is acceptable and appears patient is not receiving prescriptions from multiple prescribers. Patient is not forthcoming regarding prescriptions for pain medication in the past  Controlled Substances Monitoring: The following screens were also reviewed  ORT- the score is 1. (Values:cates patient is  <4minimal potential  4-7 Moderate potential  >7 High potential  for drug addiction  Counselling/Preventive measures for pain  Control:    [x]  Spine strengthening exercises are discussed with patient in detail. Orders Placed This Encounter   Procedures    DRUG SCREEN, PAIN     Standing Status:   Standing     Number of Occurrences:   1     Patient is not up front with me regarding her drug history. She was getting Suboxone until May 2018 she did not reveal the fact in spite of asking her a few times. She also exhibits pain behaviors. Hence at this time I'm not comfortable treating her. Patient was told that I will review the charts in her urine screen and if we change her mind then we will call her.  Otherwise she needs to be followed up with her primary care physician for further management of her pain. Decision Making Process : Patient's health history and referral records thoroughly reviewed before focused physical examination and discussion with patient. Over 50% of today's visit is spent on examining the patient and counseling. Level of complexity of date to be reviewed is Moderate. The chart date reviewed include the following: Imaging Reports. Summary of Care. Time spent reviewing with patient the below reports:   Medication safety, Treatment options. Level of diagnosis and management options of this case is multiple: involving the following management options: Interventions as needed, medication management as appropriate, future visits, activity modification, heat/ice as needed, Urine drug screen as required. [x]The patient's questions were answered to the best of my abilities. This note was created using voice recognition software. There may be inaccuracies of transcription  that are inadvertently overlooked prior to the signature. There is any questions about the transcription please contact me.     Electronically signed by Darwin Barrios MD on 4/18/2019 at 5:06 AM

## 2019-04-21 LAB
6-ACETYLMORPHINE, UR: NOT DETECTED
7-AMINOCLONAZEPAM, URINE: NOT DETECTED
ALPHA-OH-ALPRAZ, URINE: NOT DETECTED
ALPRAZOLAM, URINE: NOT DETECTED
AMPHETAMINES, URINE: NOT DETECTED
BARBITURATES, URINE: NOT DETECTED
BENZOYLECGONINE, UR: NOT DETECTED
BUPRENORPHINE URINE: NOT DETECTED
CARISOPRODOL, UR: NOT DETECTED
CLONAZEPAM, URINE: NOT DETECTED
CODEINE, URINE: NOT DETECTED
CREATININE URINE: 46.9 MG/DL (ref 20–400)
DIAZEPAM, URINE: NOT DETECTED
DRUGS EXPECTED, UR: NORMAL
EER HI RES INTERP UR: NORMAL
ETHYL GLUCURONIDE UR: NOT DETECTED
FENTANYL URINE: NOT DETECTED
HYDROCODONE, URINE: NOT DETECTED
HYDROMORPHONE, URINE: NOT DETECTED
LORAZEPAM, URINE: PRESENT
MARIJUANA METAB, UR: NOT DETECTED
MDA, UR: NOT DETECTED
MDEA, EVE, UR: NOT DETECTED
MDMA URINE: NOT DETECTED
MEPERIDINE METAB, UR: NOT DETECTED
METHADONE, URINE: NOT DETECTED
METHAMPHETAMINE, URINE: NOT DETECTED
METHYLPHENIDATE: NOT DETECTED
MIDAZOLAM, URINE: NOT DETECTED
MORPHINE URINE: NOT DETECTED
NORBUPRENORPHINE, URINE: PRESENT
NORDIAZEPAM, URINE: NOT DETECTED
NORFENTANYL, URINE: NOT DETECTED
NORHYDROCODONE, URINE: NOT DETECTED
NOROXYCODONE, URINE: NOT DETECTED
NOROXYMORPHONE, URINE: NOT DETECTED
OXAZEPAM, URINE: NOT DETECTED
OXYCODONE URINE: NOT DETECTED
OXYMORPHONE, URINE: NOT DETECTED
PAIN MANAGEMENT DRUG PANEL INTERP, URINE: NORMAL
PAIN MGT DRUG PANEL, HI RES, UR: NORMAL
PCP,URINE: NOT DETECTED
PHENTERMINE, UR: NOT DETECTED
PROPOXYPHENE, URINE: NOT DETECTED
TAPENTADOL, URINE: NOT DETECTED
TAPENTADOL-O-SULFATE, URINE: NOT DETECTED
TEMAZEPAM, URINE: NOT DETECTED
TRAMADOL, URINE: NOT DETECTED
ZOLPIDEM, URINE: NOT DETECTED

## 2019-07-19 ENCOUNTER — OFFICE VISIT (OUTPATIENT)
Dept: FAMILY MEDICINE CLINIC | Age: 59
End: 2019-07-19
Payer: COMMERCIAL

## 2019-07-19 VITALS
OXYGEN SATURATION: 100 % | DIASTOLIC BLOOD PRESSURE: 78 MMHG | TEMPERATURE: 97.9 F | HEART RATE: 92 BPM | SYSTOLIC BLOOD PRESSURE: 134 MMHG

## 2019-07-19 DIAGNOSIS — R09.81 SINUS CONGESTION: Primary | ICD-10-CM

## 2019-07-19 DIAGNOSIS — F40.9 FEAR: ICD-10-CM

## 2019-07-19 DIAGNOSIS — M25.561 ACUTE PAIN OF RIGHT KNEE: ICD-10-CM

## 2019-07-19 DIAGNOSIS — M25.511 ACUTE PAIN OF RIGHT SHOULDER: ICD-10-CM

## 2019-07-19 PROCEDURE — 99214 OFFICE O/P EST MOD 30 MIN: CPT | Performed by: FAMILY MEDICINE

## 2019-07-19 PROCEDURE — 1036F TOBACCO NON-USER: CPT | Performed by: FAMILY MEDICINE

## 2019-07-19 PROCEDURE — 3017F COLORECTAL CA SCREEN DOC REV: CPT | Performed by: FAMILY MEDICINE

## 2019-07-19 PROCEDURE — G8428 CUR MEDS NOT DOCUMENT: HCPCS | Performed by: FAMILY MEDICINE

## 2019-07-19 PROCEDURE — G8419 CALC BMI OUT NRM PARAM NOF/U: HCPCS | Performed by: FAMILY MEDICINE

## 2019-07-19 RX ORDER — FLUVOXAMINE MALEATE 100 MG/1
100 CAPSULE, EXTENDED RELEASE ORAL NIGHTLY
Qty: 30 CAPSULE | Refills: 1 | Status: SHIPPED | OUTPATIENT
Start: 2019-07-19 | End: 2019-09-17 | Stop reason: SDUPTHER

## 2019-07-19 RX ORDER — FLUOXETINE HYDROCHLORIDE 20 MG/1
40 CAPSULE ORAL DAILY
Qty: 30 CAPSULE | Refills: 3 | Status: SHIPPED | OUTPATIENT
Start: 2019-07-19 | End: 2019-07-19

## 2019-07-19 NOTE — PROGRESS NOTES
General FM note    Amara Pro is a 59 y.o. female who presents today for follow up on her  medical conditions as noted below. Amara Pro is c/o of   Chief Complaint   Patient presents with    Arm Pain    Leg Pain       Patient Active Problem List:     Depressed mood     Scarring of lung     Past Medical History:   Diagnosis Date    Back pain     Bowel obstruction ([address]     Depression       Past Surgical History:   Procedure Laterality Date    HAND SURGERY Left 01/2017    PARATHYROIDECTOMY      SPINE SURGERY      BENIGNO AND BSO Left     THYROIDECTOMY, PARTIAL      WRIST FRACTURE SURGERY       No family history on file. Current Outpatient Medications   Medication Sig Dispense Refill    fluvoxaMINE (LUVOX CR) 100 MG CP24 extended release capsule Take 100 mg by mouth nightly 30 capsule 1    oxybutynin (DITROPAN XL) 10 MG extended release tablet Take 1 tablet by mouth daily 30 tablet 3    tiZANidine (ZANAFLEX) 4 MG tablet Take 1 tablet by mouth 3 times daily 30 tablet 0    lansoprazole (PREVACID) 30 MG delayed release capsule Take 1 capsule by mouth daily 30 capsule 3    montelukast (SINGULAIR) 10 MG tablet Take 1 tablet by mouth nightly 30 tablet 3    fluticasone (FLONASE) 50 MCG/ACT nasal spray 1 spray by Each Nare route daily 1 Bottle 3    levocetirizine (XYZAL) 5 MG tablet Take 1 tablet by mouth nightly 30 tablet 2    albuterol sulfate HFA (VENTOLIN HFA) 108 (90 Base) MCG/ACT inhaler Inhale 2 puffs into the lungs every 4 hours as needed for Wheezing 1 Inhaler 0    cloNIDine (CATAPRES) 0.1 MG tablet Take 0.1 mg by mouth 2 times daily      magnesium 30 MG tablet Take 30 mg by mouth 2 times daily       No current facility-administered medications for this visit.       ALLERGIES:  No Known Allergies    Social History     Tobacco Use    Smoking status: Never Smoker    Smokeless tobacco: Never Used   Substance Use Topics    Alcohol use: No      There is no height or weight on

## 2019-07-22 ENCOUNTER — HOSPITAL ENCOUNTER (OUTPATIENT)
Age: 59
Discharge: HOME | End: 2019-07-22
Payer: COMMERCIAL

## 2019-07-22 DIAGNOSIS — R09.81 SINUS CONGESTION: ICD-10-CM

## 2019-07-22 DIAGNOSIS — F40.9 FEAR: ICD-10-CM

## 2019-07-22 DIAGNOSIS — M25.561 ACUTE PAIN OF RIGHT KNEE: ICD-10-CM

## 2019-07-22 LAB
ABSOLUTE EOS #: 0.08 K/UL (ref 0–0.44)
ABSOLUTE IMMATURE GRANULOCYTE: <0.03 K/UL (ref 0–0.3)
ABSOLUTE LYMPH #: 2.03 K/UL (ref 1.1–3.7)
ABSOLUTE MONO #: 0.4 K/UL (ref 0.1–1.2)
ALBUMIN SERPL-MCNC: 4 G/DL (ref 3.5–5.2)
ALBUMIN/GLOBULIN RATIO: 1.2 (ref 1–2.5)
ALP BLD-CCNC: 102 U/L (ref 35–104)
ALT SERPL-CCNC: 19 U/L (ref 5–33)
ANION GAP SERPL CALCULATED.3IONS-SCNC: 14 MMOL/L (ref 9–17)
AST SERPL-CCNC: 20 U/L
BASOPHILS # BLD: 1 % (ref 0–2)
BASOPHILS ABSOLUTE: 0.04 K/UL (ref 0–0.2)
BILIRUB SERPL-MCNC: <0.1 MG/DL (ref 0.3–1.2)
BUN BLDV-MCNC: 9 MG/DL (ref 8–23)
BUN/CREAT BLD: ABNORMAL (ref 9–20)
CALCIUM SERPL-MCNC: 9.5 MG/DL (ref 8.6–10.4)
CHLORIDE BLD-SCNC: 104 MMOL/L (ref 98–107)
CO2: 23 MMOL/L (ref 20–31)
CREAT SERPL-MCNC: 0.94 MG/DL (ref 0.5–0.9)
DIFFERENTIAL TYPE: NORMAL
EOSINOPHILS RELATIVE PERCENT: 1 % (ref 1–4)
GFR AFRICAN AMERICAN: >60 ML/MIN
GFR NON-AFRICAN AMERICAN: >60 ML/MIN
GFR SERPL CREATININE-BSD FRML MDRD: ABNORMAL ML/MIN/{1.73_M2}
GFR SERPL CREATININE-BSD FRML MDRD: ABNORMAL ML/MIN/{1.73_M2}
GLUCOSE BLD-MCNC: 106 MG/DL (ref 70–99)
HCT VFR BLD CALC: 39.3 % (ref 36.3–47.1)
HEMOGLOBIN: 11.9 G/DL (ref 11.9–15.1)
IMMATURE GRANULOCYTES: 0 %
LYMPHOCYTES # BLD: 32 % (ref 24–43)
MCH RBC QN AUTO: 30 PG (ref 25.2–33.5)
MCHC RBC AUTO-ENTMCNC: 30.3 G/DL (ref 28.4–34.8)
MCV RBC AUTO: 99 FL (ref 82.6–102.9)
MONOCYTES # BLD: 6 % (ref 3–12)
NRBC AUTOMATED: 0 PER 100 WBC
PDW BLD-RTO: 13.6 % (ref 11.8–14.4)
PLATELET # BLD: 199 K/UL (ref 138–453)
PLATELET ESTIMATE: NORMAL
PMV BLD AUTO: 11.9 FL (ref 8.1–13.5)
POTASSIUM SERPL-SCNC: 4.4 MMOL/L (ref 3.7–5.3)
RBC # BLD: 3.97 M/UL (ref 3.95–5.11)
RBC # BLD: NORMAL 10*6/UL
SEG NEUTROPHILS: 60 % (ref 36–65)
SEGMENTED NEUTROPHILS ABSOLUTE COUNT: 3.8 K/UL (ref 1.5–8.1)
SODIUM BLD-SCNC: 141 MMOL/L (ref 135–144)
TOTAL PROTEIN: 7.4 G/DL (ref 6.4–8.3)
WBC # BLD: 6.4 K/UL (ref 3.5–11.3)
WBC # BLD: NORMAL 10*3/UL

## 2019-07-22 PROCEDURE — 85025 COMPLETE CBC W/AUTO DIFF WBC: CPT

## 2019-07-22 PROCEDURE — 86003 ALLG SPEC IGE CRUDE XTRC EA: CPT

## 2019-07-22 PROCEDURE — 82785 ASSAY OF IGE: CPT

## 2019-07-22 PROCEDURE — 80053 COMPREHEN METABOLIC PANEL: CPT

## 2019-07-22 PROCEDURE — 36415 COLL VENOUS BLD VENIPUNCTURE: CPT

## 2019-07-24 LAB
2000687N OAK TREE IGE: <0.34 KU/L (ref 0–0.34)
ALLERGEN BERMUDA GRASS IGE: <0.34 KU/L (ref 0–0.34)
ALLERGEN BIRCH IGE: <0.34 KU/L (ref 0–0.34)
ALLERGEN COW MILK IGE: <0.34 KU/L (ref 0–0.34)
ALLERGEN DOG DANDER IGE: <0.34 KU/L (ref 0–0.34)
ALLERGEN GERMAN COCKROACH IGE: <0.34 KU/L (ref 0–0.34)
ALLERGEN HORMODENDRUM IGE: <0.34 KUL/L (ref 0–0.34)
ALLERGEN MOUSE EPITHELIA IGE: <0.34 KU/L (ref 0–0.34)
ALLERGEN PEANUT (F13) IGE: <0.34 KU/L (ref 0–0.34)
ALLERGEN PECAN TREE IGE: <0.34 KU/L (ref 0–0.34)
ALLERGEN PIGWEED ROUGH IGE: <0.34 KU/L (ref 0–0.34)
ALLERGEN SHEEP SORREL (W18) IGE: <0.34 KU/L (ref 0–0.34)
ALLERGEN TREE SYCAMORE: <0.34 KU/L (ref 0–0.34)
ALLERGEN WALNUT TREE IGE: <0.34 KU/L (ref 0–0.34)
ALLERGEN WHITE MULBERRY TREE, IGE: <0.34 KU/L (ref 0–0.34)
ALLERGEN, TREE, WHITE ASH IGE: <0.34 KU/L (ref 0–0.34)
ALTERNARIA ALTERNATA: <0.34 KU/L (ref 0–0.34)
ASPERGILLUS FUMIGATUS: <0.34 KU/L (ref 0–0.34)
CAT DANDER ANTIBODY: <0.34 KU/L (ref 0–0.34)
COTTONWOOD TREE: <0.34 KU/L (ref 0–0.34)
D. FARINAE: <0.34 KU/L (ref 0–0.34)
D. PTERONYSSINUS: <0.34 KU/L (ref 0–0.34)
ELM TREE: <0.34 KU/L (ref 0–0.34)
IGE: 48 IU/ML
MAPLE/BOXELDER TREE: <0.34 KU/L (ref 0–0.34)
MOUNTAIN CEDAR TREE: <0.34 KU/L (ref 0–0.34)
MUCOR RACEMOSUS: <0.34 KU/L (ref 0–0.34)
P. NOTATUM: <0.34 KU/L (ref 0–0.34)
RUSSIAN THISTLE: <0.34 KU/L (ref 0–0.34)
SHORT RAGWD(A ARTEMIS.) IGE: <0.34 KU/L (ref 0–0.34)
TIMOTHY GRASS: <0.34 KU/L (ref 0–0.34)

## 2019-09-17 RX ORDER — FLUVOXAMINE MALEATE 100 MG/1
CAPSULE, EXTENDED RELEASE ORAL
Qty: 30 CAPSULE | Refills: 1 | Status: SHIPPED | OUTPATIENT
Start: 2019-09-17 | End: 2019-11-26 | Stop reason: SDUPTHER

## 2019-11-27 RX ORDER — FLUVOXAMINE MALEATE 100 MG/1
CAPSULE, EXTENDED RELEASE ORAL
Qty: 30 CAPSULE | Refills: 1 | Status: SHIPPED | OUTPATIENT
Start: 2019-11-27 | End: 2020-03-03

## 2020-03-03 ENCOUNTER — OFFICE VISIT (OUTPATIENT)
Dept: FAMILY MEDICINE CLINIC | Age: 60
End: 2020-03-03
Payer: COMMERCIAL

## 2020-03-03 ENCOUNTER — HOSPITAL ENCOUNTER (OUTPATIENT)
Age: 60
Setting detail: SPECIMEN
Discharge: HOME | End: 2020-03-03
Payer: COMMERCIAL

## 2020-03-03 VITALS
OXYGEN SATURATION: 97 % | BODY MASS INDEX: 29.8 KG/M2 | HEART RATE: 60 BPM | TEMPERATURE: 97 F | WEIGHT: 196 LBS | DIASTOLIC BLOOD PRESSURE: 79 MMHG | SYSTOLIC BLOOD PRESSURE: 133 MMHG

## 2020-03-03 LAB
BILIRUBIN, POC: NEGATIVE
BLOOD URINE, POC: ABNORMAL
CLARITY, POC: ABNORMAL
COLOR, POC: YELLOW
GLUCOSE URINE, POC: NEGATIVE
KETONES, POC: NEGATIVE
LEUKOCYTE EST, POC: ABNORMAL
NITRITE, POC: POSITIVE
PH, POC: 5.5
PROTEIN, POC: NEGATIVE
SPECIFIC GRAVITY, POC: 1.01
UROBILINOGEN, POC: 0.2

## 2020-03-03 PROCEDURE — 1036F TOBACCO NON-USER: CPT | Performed by: FAMILY MEDICINE

## 2020-03-03 PROCEDURE — G0444 DEPRESSION SCREEN ANNUAL: HCPCS | Performed by: FAMILY MEDICINE

## 2020-03-03 PROCEDURE — G8419 CALC BMI OUT NRM PARAM NOF/U: HCPCS | Performed by: FAMILY MEDICINE

## 2020-03-03 PROCEDURE — G8431 POS CLIN DEPRES SCRN F/U DOC: HCPCS | Performed by: FAMILY MEDICINE

## 2020-03-03 PROCEDURE — G8427 DOCREV CUR MEDS BY ELIG CLIN: HCPCS | Performed by: FAMILY MEDICINE

## 2020-03-03 PROCEDURE — G8484 FLU IMMUNIZE NO ADMIN: HCPCS | Performed by: FAMILY MEDICINE

## 2020-03-03 PROCEDURE — 3017F COLORECTAL CA SCREEN DOC REV: CPT | Performed by: FAMILY MEDICINE

## 2020-03-03 PROCEDURE — 99214 OFFICE O/P EST MOD 30 MIN: CPT | Performed by: FAMILY MEDICINE

## 2020-03-03 PROCEDURE — 81003 URINALYSIS AUTO W/O SCOPE: CPT | Performed by: FAMILY MEDICINE

## 2020-03-03 RX ORDER — FLUOXETINE HYDROCHLORIDE 20 MG/1
20 CAPSULE ORAL DAILY
Qty: 30 CAPSULE | Refills: 5 | Status: SHIPPED | OUTPATIENT
Start: 2020-03-03 | End: 2020-07-09

## 2020-03-03 RX ORDER — NITROFURANTOIN 25; 75 MG/1; MG/1
100 CAPSULE ORAL 2 TIMES DAILY
Qty: 20 CAPSULE | Refills: 0 | Status: SHIPPED | OUTPATIENT
Start: 2020-03-03 | End: 2020-03-13

## 2020-03-03 ASSESSMENT — PATIENT HEALTH QUESTIONNAIRE - PHQ9
3. TROUBLE FALLING OR STAYING ASLEEP: 3
2. FEELING DOWN, DEPRESSED OR HOPELESS: 3
SUM OF ALL RESPONSES TO PHQ QUESTIONS 1-9: 15
8. MOVING OR SPEAKING SO SLOWLY THAT OTHER PEOPLE COULD HAVE NOTICED. OR THE OPPOSITE, BEING SO FIGETY OR RESTLESS THAT YOU HAVE BEEN MOVING AROUND A LOT MORE THAN USUAL: 3
1. LITTLE INTEREST OR PLEASURE IN DOING THINGS: 0
6. FEELING BAD ABOUT YOURSELF - OR THAT YOU ARE A FAILURE OR HAVE LET YOURSELF OR YOUR FAMILY DOWN: 0
SUM OF ALL RESPONSES TO PHQ9 QUESTIONS 1 & 2: 3
10. IF YOU CHECKED OFF ANY PROBLEMS, HOW DIFFICULT HAVE THESE PROBLEMS MADE IT FOR YOU TO DO YOUR WORK, TAKE CARE OF THINGS AT HOME, OR GET ALONG WITH OTHER PEOPLE: 0
SUM OF ALL RESPONSES TO PHQ QUESTIONS 1-9: 15
9. THOUGHTS THAT YOU WOULD BE BETTER OFF DEAD, OR OF HURTING YOURSELF: 0
5. POOR APPETITE OR OVEREATING: 0
7. TROUBLE CONCENTRATING ON THINGS, SUCH AS READING THE NEWSPAPER OR WATCHING TELEVISION: 3
4. FEELING TIRED OR HAVING LITTLE ENERGY: 3

## 2020-03-05 LAB
CULTURE: ABNORMAL
Lab: ABNORMAL
SPECIMEN DESCRIPTION: ABNORMAL

## 2020-04-24 ENCOUNTER — NURSE TRIAGE (OUTPATIENT)
Dept: OTHER | Facility: CLINIC | Age: 60
End: 2020-04-24

## 2020-04-27 NOTE — TELEPHONE ENCOUNTER
Information correct as scribed.
(Note: Constant means the pain never goes away completely; most serious pain is constant and it progresses)      - If intermittent: \"How long does it last?\" \"Do you have pain now? \"      (Note: Intermittent means the pain goes away completely between bouts)      Comes and goes  6. SEVERITY: \"How bad is the pain? \"  (e.g., Scale 1-10; mild, moderate, or severe)    - MILD (1-3): doesn't interfere with normal activities, abdomen soft and not tender to touch     - MODERATE (4-7): interferes with normal activities or awakens from sleep, tender to touch     - SEVERE (8-10): excruciating pain, doubled over, unable to do any normal activities       5  7. RECURRENT SYMPTOM: \"Have you ever had this type of abdominal pain before? \" If so, ask: \"When was the last time? \" and \"What happened that time? \"       No  8. CAUSE: \"What do you think is causing the abdominal pain? \"      No  9. RELIEVING/AGGRAVATING FACTORS: \"What makes it better or worse? \" (e.g., movement, antacids, bowel movement)      Doesn't know, thought maybe remeron causing weight gain  10. OTHER SYMPTOMS: \"Has there been any vomiting, diarrhea, constipation, or urine problems? \"        No  11. PREGNANCY: \"Is there any chance you are pregnant? \" \"When was your last menstrual period? \"        n/a    Protocols used: ABDOMINAL PAIN - FEMALE-ADULT-[place]

## 2020-05-22 RX ORDER — LANSOPRAZOLE 30 MG/1
30 CAPSULE, DELAYED RELEASE ORAL DAILY
Qty: 30 CAPSULE | Refills: 3 | Status: SHIPPED | OUTPATIENT
Start: 2020-05-22 | End: 2021-07-01

## 2020-05-22 RX ORDER — FLUVOXAMINE MALEATE 100 MG/1
CAPSULE, EXTENDED RELEASE ORAL
Qty: 30 CAPSULE | Refills: 1 | Status: SHIPPED | OUTPATIENT
Start: 2020-05-22 | End: 2020-08-04

## 2020-07-09 ENCOUNTER — HOSPITAL ENCOUNTER (OUTPATIENT)
Age: 60
Setting detail: SPECIMEN
Discharge: HOME | End: 2020-07-09
Payer: COMMERCIAL

## 2020-07-09 ENCOUNTER — OFFICE VISIT (OUTPATIENT)
Dept: FAMILY MEDICINE CLINIC | Age: 60
End: 2020-07-09
Payer: COMMERCIAL

## 2020-07-09 VITALS
HEIGHT: 68 IN | WEIGHT: 194.6 LBS | BODY MASS INDEX: 29.49 KG/M2 | SYSTOLIC BLOOD PRESSURE: 121 MMHG | TEMPERATURE: 97 F | DIASTOLIC BLOOD PRESSURE: 66 MMHG | HEART RATE: 85 BPM | OXYGEN SATURATION: 98 %

## 2020-07-09 LAB
ABSOLUTE EOS #: 0.12 K/UL (ref 0–0.44)
ABSOLUTE IMMATURE GRANULOCYTE: <0.03 K/UL (ref 0–0.3)
ABSOLUTE LYMPH #: 2.22 K/UL (ref 1.1–3.7)
ABSOLUTE MONO #: 0.42 K/UL (ref 0.1–1.2)
ALBUMIN SERPL-MCNC: 4.2 G/DL (ref 3.5–5.2)
ALBUMIN/GLOBULIN RATIO: 1.2 (ref 1–2.5)
ALP BLD-CCNC: 97 U/L (ref 35–104)
ALT SERPL-CCNC: 18 U/L (ref 5–33)
ANION GAP SERPL CALCULATED.3IONS-SCNC: 12 MMOL/L (ref 9–17)
AST SERPL-CCNC: 24 U/L
BASOPHILS # BLD: 1 % (ref 0–2)
BASOPHILS ABSOLUTE: 0.04 K/UL (ref 0–0.2)
BILIRUB SERPL-MCNC: <0.1 MG/DL (ref 0.3–1.2)
BILIRUBIN, POC: ABNORMAL
BLOOD URINE, POC: ABNORMAL
BUN BLDV-MCNC: 13 MG/DL (ref 8–23)
BUN/CREAT BLD: ABNORMAL (ref 9–20)
CALCIUM IONIZED: 1.39 MMOL/L (ref 1.13–1.33)
CALCIUM SERPL-MCNC: 10 MG/DL (ref 8.6–10.4)
CHLORIDE BLD-SCNC: 100 MMOL/L (ref 98–107)
CLARITY, POC: ABNORMAL
CO2: 30 MMOL/L (ref 20–31)
COLOR, POC: ABNORMAL
CREAT SERPL-MCNC: 1.05 MG/DL (ref 0.5–0.9)
DIFFERENTIAL TYPE: NORMAL
EOSINOPHILS RELATIVE PERCENT: 2 % (ref 1–4)
GFR AFRICAN AMERICAN: >60 ML/MIN
GFR NON-AFRICAN AMERICAN: 53 ML/MIN
GFR SERPL CREATININE-BSD FRML MDRD: ABNORMAL ML/MIN/{1.73_M2}
GFR SERPL CREATININE-BSD FRML MDRD: ABNORMAL ML/MIN/{1.73_M2}
GLUCOSE BLD-MCNC: 61 MG/DL (ref 70–99)
GLUCOSE URINE, POC: ABNORMAL
HCT VFR BLD CALC: 41.7 % (ref 36.3–47.1)
HEMOGLOBIN: 13.2 G/DL (ref 11.9–15.1)
IMMATURE GRANULOCYTES: 0 %
KETONES, POC: ABNORMAL
LEUKOCYTE EST, POC: ABNORMAL
LYMPHOCYTES # BLD: 36 % (ref 24–43)
MCH RBC QN AUTO: 31.1 PG (ref 25.2–33.5)
MCHC RBC AUTO-ENTMCNC: 31.7 G/DL (ref 28.4–34.8)
MCV RBC AUTO: 98.1 FL (ref 82.6–102.9)
MONOCYTES # BLD: 7 % (ref 3–12)
NITRITE, POC: ABNORMAL
NRBC AUTOMATED: 0 PER 100 WBC
PDW BLD-RTO: 13.6 % (ref 11.8–14.4)
PH, POC: 7
PLATELET # BLD: 177 K/UL (ref 138–453)
PLATELET ESTIMATE: NORMAL
PMV BLD AUTO: 12.1 FL (ref 8.1–13.5)
POTASSIUM SERPL-SCNC: 4.2 MMOL/L (ref 3.7–5.3)
PROTEIN, POC: ABNORMAL
PTH INTACT: 45.09 PG/ML (ref 15–65)
RBC # BLD: 4.25 M/UL (ref 3.95–5.11)
RBC # BLD: NORMAL 10*6/UL
RHEUMATOID FACTOR: >650 IU/ML
SEG NEUTROPHILS: 54 % (ref 36–65)
SEGMENTED NEUTROPHILS ABSOLUTE COUNT: 3.35 K/UL (ref 1.5–8.1)
SODIUM BLD-SCNC: 142 MMOL/L (ref 135–144)
SPECIFIC GRAVITY, POC: 1.02
TOTAL PROTEIN: 7.8 G/DL (ref 6.4–8.3)
UROBILINOGEN, POC: 0.2
WBC # BLD: 6.2 K/UL (ref 3.5–11.3)
WBC # BLD: NORMAL 10*3/UL

## 2020-07-09 PROCEDURE — 1036F TOBACCO NON-USER: CPT | Performed by: FAMILY MEDICINE

## 2020-07-09 PROCEDURE — 99214 OFFICE O/P EST MOD 30 MIN: CPT | Performed by: FAMILY MEDICINE

## 2020-07-09 PROCEDURE — G8427 DOCREV CUR MEDS BY ELIG CLIN: HCPCS | Performed by: FAMILY MEDICINE

## 2020-07-09 PROCEDURE — 81003 URINALYSIS AUTO W/O SCOPE: CPT | Performed by: FAMILY MEDICINE

## 2020-07-09 PROCEDURE — G8419 CALC BMI OUT NRM PARAM NOF/U: HCPCS | Performed by: FAMILY MEDICINE

## 2020-07-09 PROCEDURE — 3017F COLORECTAL CA SCREEN DOC REV: CPT | Performed by: FAMILY MEDICINE

## 2020-07-09 RX ORDER — MIRTAZAPINE 30 MG/1
30 TABLET, FILM COATED ORAL NIGHTLY
Qty: 30 TABLET | Refills: 5 | Status: SHIPPED | OUTPATIENT
Start: 2020-07-09 | End: 2020-12-04 | Stop reason: SDUPTHER
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
## 2020-07-10 LAB
CULTURE: NORMAL
Lab: NORMAL
SPECIMEN DESCRIPTION: NORMAL

## 2020-07-13 NOTE — RESULT ENCOUNTER NOTE
Rheumatoid factor is quite elevated. Please send the patient to a rheumatologist.  Thank you. Other blood work was normal limits.

## 2020-07-24 ENCOUNTER — TELEPHONE (OUTPATIENT)
Dept: FAMILY MEDICINE CLINIC | Age: 60
End: 2020-07-24

## 2020-07-27 RX ORDER — MELOXICAM 7.5 MG/1
7.5 TABLET ORAL DAILY
Qty: 30 TABLET | Refills: 3 | Status: SHIPPED | OUTPATIENT
Start: 2020-07-27 | End: 2021-11-11 | Stop reason: SDUPTHER

## 2020-08-04 RX ORDER — FLUVOXAMINE MALEATE 100 MG/1
CAPSULE, EXTENDED RELEASE ORAL
Qty: 30 CAPSULE | Refills: 1 | Status: SHIPPED | OUTPATIENT
Start: 2020-08-04 | End: 2020-12-03 | Stop reason: SDUPTHER

## 2020-08-17 ENCOUNTER — NURSE TRIAGE (OUTPATIENT)
Dept: OTHER | Facility: CLINIC | Age: 60
End: 2020-08-17

## 2020-08-17 NOTE — TELEPHONE ENCOUNTER
Reason for Disposition   Patient wants to be seen    Answer Assessment - Initial Assessment Questions  1. ONSET: \"When did the pain start? \" (e.g., minutes, hours, days)      1 month ago was swollen and felt like it was punched in the eye. Then went away and now very painful and bruised  2. TIMING: \"Does the pain come and go, or has it been constant since it started? \" (e.g., constant, intermittent, fleeting)      Constant today  3. SEVERITY: \"How bad is the pain? \"   (Scale 1-10; mild, moderate or severe)    - MILD (1-3): doesn't interfere with normal activities     - MODERATE (4-7): interferes with normal activities or awakens from sleep     - SEVERE (8-10): excruciating pain and patient unable to do normal activities      4 moderate  4. LOCATION: \"Where does it hurt? \"  (e.g., eyelid, eye, cheekbone)      Left eye top of eyelid  5. CAUSE: \"What do you think is causing the pain? \"      Hit left eye on car door a while ago  6. VISION: \"Do you have blurred vision or changes in your vision? \"      Is ok  7. EYE DISCHARGE: \"Is there any discharge (pus) from the eye(s)? \"  If yes, ask: \"What color is it? \"     no  8. FEVER: \"Do you have a fever? \" If so, ask: \"What is it, how was it measured, and when did it start? \"     no  9. OTHER SYMPTOMS: \"Do you have any other symptoms? \" (e.g., headache, nasal discharge, facial rash)     no  10. PREGNANCY: \"Is there any chance you are pregnant? \" \"When was your last menstrual period? \"        no    Protocols used: EYE PAIN-ADULT-[place]    Left eyelid is bruised looking and swollen and painful for a month. Did come and go but now is constant. Recommend per protocol to be seen within 2 days and she agree ad can not do a VV    Received call from [facility]. Call soft transferred to [address] to schedule appointment. Please do not reply to the triage nurse through this encounter. Any subsequent communication should be directly with the patient.

## 2020-08-18 ENCOUNTER — TELEPHONE (OUTPATIENT)
Dept: FAMILY MEDICINE CLINIC | Age: 60
End: 2020-08-18

## 2020-08-18 ENCOUNTER — OFFICE VISIT (OUTPATIENT)
Dept: FAMILY MEDICINE CLINIC | Age: 60
End: 2020-08-18
Payer: COMMERCIAL

## 2020-08-18 VITALS
BODY MASS INDEX: 29.5 KG/M2 | TEMPERATURE: 97.2 F | OXYGEN SATURATION: 97 % | HEART RATE: 90 BPM | DIASTOLIC BLOOD PRESSURE: 70 MMHG | WEIGHT: 194 LBS | SYSTOLIC BLOOD PRESSURE: 125 MMHG

## 2020-08-18 PROCEDURE — 1036F TOBACCO NON-USER: CPT | Performed by: FAMILY MEDICINE

## 2020-08-18 PROCEDURE — G8427 DOCREV CUR MEDS BY ELIG CLIN: HCPCS | Performed by: FAMILY MEDICINE

## 2020-08-18 PROCEDURE — 99214 OFFICE O/P EST MOD 30 MIN: CPT | Performed by: FAMILY MEDICINE

## 2020-08-18 PROCEDURE — G8419 CALC BMI OUT NRM PARAM NOF/U: HCPCS | Performed by: FAMILY MEDICINE

## 2020-08-18 PROCEDURE — 3017F COLORECTAL CA SCREEN DOC REV: CPT | Performed by: FAMILY MEDICINE

## 2020-08-18 RX ORDER — MELOXICAM 15 MG/1
15 TABLET ORAL DAILY
Qty: 30 TABLET | Refills: 3 | Status: SHIPPED | OUTPATIENT
Start: 2020-08-18

## 2020-08-18 RX ORDER — ESTRADIOL AND NORETHINDRONE ACETATE 1; .5 MG/1; MG/1
1 TABLET ORAL DAILY
Qty: 28 TABLET | Refills: 0 | Status: SHIPPED | OUTPATIENT
Start: 2020-08-18 | End: 2020-09-18

## 2020-08-18 SDOH — ECONOMIC STABILITY: FOOD INSECURITY: WITHIN THE PAST 12 MONTHS, YOU WORRIED THAT YOUR FOOD WOULD RUN OUT BEFORE YOU GOT MONEY TO BUY MORE.: NEVER TRUE

## 2020-08-18 SDOH — ECONOMIC STABILITY: FOOD INSECURITY: WITHIN THE PAST 12 MONTHS, THE FOOD YOU BOUGHT JUST DIDN'T LAST AND YOU DIDN'T HAVE MONEY TO GET MORE.: NEVER TRUE

## 2020-08-18 SDOH — ECONOMIC STABILITY: INCOME INSECURITY: HOW HARD IS IT FOR YOU TO PAY FOR THE VERY BASICS LIKE FOOD, HOUSING, MEDICAL CARE, AND HEATING?: NOT HARD AT ALL

## 2020-08-18 ASSESSMENT — PATIENT HEALTH QUESTIONNAIRE - PHQ9
2. FEELING DOWN, DEPRESSED OR HOPELESS: 1
SUM OF ALL RESPONSES TO PHQ QUESTIONS 1-9: 1
SUM OF ALL RESPONSES TO PHQ QUESTIONS 1-9: 1

## 2020-08-18 NOTE — PROGRESS NOTES
General FM note    Danny Flores is a 56 y.o. female who presents today for follow up on her  medical conditions as noted below. Danny Flores is c/o of   Chief Complaint   Patient presents with    Eye Pain     left eyelid swelling       Patient Active Problem List:     Depressed mood     Scarring of lung     Past Medical History:   Diagnosis Date    Back pain     Bowel obstruction ([address]     Depression       Past Surgical History:   Procedure Laterality Date    HAND SURGERY Left 01/2017    PARATHYROIDECTOMY      SPINE SURGERY      BENIGNO AND BSO Left     THYROIDECTOMY, PARTIAL      WRIST FRACTURE SURGERY       No family history on file.   Current Outpatient Medications   Medication Sig Dispense Refill    estradiol-norethindrone (ACTIVELLA) 1-0.5 MG per tablet Take 1 tablet by mouth daily 28 tablet 0    meloxicam (MOBIC) 15 MG tablet Take 1 tablet by mouth daily 30 tablet 3    fluvoxaMINE (LUVOX CR) 100 MG CP24 extended release capsule TAKE 1 CAPSULE BY MOUTH AT BEDTIME 30 capsule 1    meloxicam (MOBIC) 7.5 MG tablet Take 1 tablet by mouth daily 30 tablet 3    mirtazapine (REMERON) 30 MG tablet Take 1 tablet by mouth nightly 30 tablet 5    lansoprazole (PREVACID) 30 MG delayed release capsule TAKE 1 CAPSULE BY MOUTH DAILY 30 capsule 3    oxybutynin (DITROPAN XL) 10 MG extended release tablet Take 1 tablet by mouth daily 30 tablet 3    tiZANidine (ZANAFLEX) 4 MG tablet Take 1 tablet by mouth 3 times daily 30 tablet 0    montelukast (SINGULAIR) 10 MG tablet Take 1 tablet by mouth nightly 30 tablet 3    levocetirizine (XYZAL) 5 MG tablet Take 1 tablet by mouth nightly 30 tablet 2    cloNIDine (CATAPRES) 0.1 MG tablet Take 0.1 mg by mouth 2 times daily      magnesium 30 MG tablet Take 30 mg by mouth 2 times daily      albuterol sulfate HFA (VENTOLIN HFA) 108 (90 Base) MCG/ACT inhaler Inhale 2 puffs into the lungs every 4 hours as needed for Wheezing 1 Inhaler 0     No current facility-administered medications for this visit. ALLERGIES:  No Known Allergies    Social History     Tobacco Use    Smoking status: Never Smoker    Smokeless tobacco: Never Used   Substance Use Topics    Alcohol use: No      Body mass index is 29.5 kg/m². /70   Pulse 90   Temp 97.2 °F (36.2 °C)   Wt 194 lb (88 kg)   SpO2 97%   BMI 29.50 kg/m²     Subjective:      HPI    55 yo female coming in today because of \"black eyes \".  1 month ago she drove with her girlfriend in the car and then she woke up with a swollen eye left it went away but then the next day she woke up with these \"black eyes \". Even her grandson tells her that she has dark circles around her eyes. She knows that she has 1 spot at her left cheek area which is always dark due to her allergies season and this dark spot does not go away but these dark circles around her eyes are not normal.  The patient also has some discomfort on and off from her left eye going to her forehead. She did just had CT of her abdomen done which was within normal limits. The patient also had some blood work done colon cancer screening/Dr. Angel Marin at [facility] which was within normal limits  Again the patient tells me she never had these dark eyes and she would like to know why she has stones. Review of Systems   Constitutional: Negative for fever and unexpected weight change. Respiratory: Negative for cough and shortness of breath. Cardiovascular: Negative for chest pain and leg swelling. Gastrointestinal: Negative for diarrhea, constipation and blood in stool. Musculoskeletal: Negative for back pain and gait problem. Skin: Negative for color change and rash. Positive for dark circles around her eyes. Neurological: Negative for dizziness and headaches. Psychiatric/Behavioral: Negative for confusion and agitation. Objective:   Physical Exam  Constitutional: VS (see above).    General appearance: normal development, habitus and attention, no deformities. No distress. Eyes: normal conjunctiva and lids. CAV: RRR, no RMG. No edema lower extremities. Pulmo: CTA bilateral, no CWR. Skin: no rashes, lesions or ulcers. Dark circles most on hyperpigmented skin around both of her eyes. Musculoskeletal: normal gait. Nails: no clubbing or cyanosis. Psychiatric: alert and oriented to place, time and person. Normal mood and affect. Assessment:       Diagnosis Orders   1. Dark Samish under eye, unspecified laterality  [facility]   2. Encounter for screening mammogram for malignant neoplasm of breast  NANCY DIGITAL SCREEN W OR WO CAD BILATERAL   3. Elevated rheumatoid factor  DEMETRIA Daley MD, Rheumatology, [facility]: We will send the patient to a dermatologist.  Blood work recently and also all screenings was with normal limits. Patient had an RA factor over 600 so she needs to see a rheumatologist.  Call or return to clinic prn if these symptoms worsen or fail to improve as anticipated. I have reviewed the instructions with the patient, answering all questions to her's satisfaction. No follow-ups on file. Orders Placed This Encounter   Procedures    NANCY DIGITAL SCREEN W OR WO CAD BILATERAL     Standing Status:   Future     Standing Expiration Date:   10/18/2021     Scheduling Instructions:      May perform additional studies at the discretion of the radiologist: Breast US, breast MRI, imaging guided biopsy, cyst aspiration or MBI.    [facility]     Referral Priority:   Routine     Referral Type:   Eval and Treat     Referral Reason:   Specialty Services Required     Requested Specialty:   Dermatology     Number of Visits Requested:   1    DEMETRIA Daley MD, Rheumatology, [place]     Referral Priority:   Routine     Referral Type:   Eval and Treat     Referral Reason:   Specialty Services Required     Referred to Provider:   Sumit García MD     Requested Specialty:   Rheumatology Number of Visits Requested:   1     Orders Placed This Encounter   Medications    estradiol-norethindrone (ACTIVELLA) 1-0.5 MG per tablet     Sig: Take 1 tablet by mouth daily     Dispense:  28 tablet     Refill:  0    meloxicam (MOBIC) 15 MG tablet     Sig: Take 1 tablet by mouth daily     Dispense:  30 tablet     Refill:  3       Nadiya received counseling on the following healthy behaviors: nutrition, exercise and medication adherence  Reviewed prior labs and health maintenance. Continue current medications, diet and exercise. Discussed use, benefit, and side effects of prescribed medications. Barriers to medication compliance addressed. Patient given educational materials - see patient instructions. All patient questions answered. Patient voiced understanding.       Electronically signed by Tammi Brennan MD on 8/19/2020 at 6:06 AM       (Please note that portions of this note were completed with a voice recognition program. Efforts were made to edit the dictations but occasionally words are mis-transcribed.)

## 2020-09-01 ENCOUNTER — HOSPITAL ENCOUNTER (OUTPATIENT)
Dept: MAMMOGRAPHY | Age: 60
Discharge: HOME | End: 2020-09-03
Payer: COMMERCIAL

## 2020-09-01 PROCEDURE — 77063 BREAST TOMOSYNTHESIS BI: CPT

## 2020-09-18 RX ORDER — ESTRADIOL AND NORETHINDRONE ACETATE 1; .5 MG/1; MG/1
1 TABLET ORAL DAILY
Qty: 28 TABLET | Refills: 0 | Status: SHIPPED | OUTPATIENT
Start: 2020-09-18 | End: 2020-11-05

## 2020-09-18 NOTE — TELEPHONE ENCOUNTER
Hunter Frederick is calling to request a refill on the following medication(s):    Last Visit Date (If Applicable):  7/39/3632    Next Visit Date:    Visit date not found    Medication Request:  Requested Prescriptions     Pending Prescriptions Disp Refills    estradiol-norethindrone (ACTIVELLA) 1-0.5 MG per tablet [Pharmacy Med Name: ESTRADIOL/NORETHINDRONE A 1MG/0.5MG ORAL TABLET] 28 tablet 0     Sig: TAKE 1 TABLET BY MOUTH DAILY

## 2020-11-05 RX ORDER — ESTRADIOL AND NORETHINDRONE ACETATE 1; .5 MG/1; MG/1
1 TABLET ORAL DAILY
Qty: 28 TABLET | Refills: 0 | Status: SHIPPED | OUTPATIENT
Start: 2020-11-05 | End: 2021-01-04

## 2020-12-03 ENCOUNTER — OFFICE VISIT (OUTPATIENT)
Dept: FAMILY MEDICINE CLINIC | Age: 60
End: 2020-12-03
Payer: COMMERCIAL

## 2020-12-03 VITALS
SYSTOLIC BLOOD PRESSURE: 125 MMHG | HEART RATE: 92 BPM | BODY MASS INDEX: 29.5 KG/M2 | TEMPERATURE: 97 F | WEIGHT: 194 LBS | DIASTOLIC BLOOD PRESSURE: 80 MMHG | OXYGEN SATURATION: 100 %

## 2020-12-03 PROCEDURE — G8419 CALC BMI OUT NRM PARAM NOF/U: HCPCS | Performed by: FAMILY MEDICINE

## 2020-12-03 PROCEDURE — G8427 DOCREV CUR MEDS BY ELIG CLIN: HCPCS | Performed by: FAMILY MEDICINE

## 2020-12-03 PROCEDURE — 99214 OFFICE O/P EST MOD 30 MIN: CPT | Performed by: FAMILY MEDICINE

## 2020-12-03 PROCEDURE — 1036F TOBACCO NON-USER: CPT | Performed by: FAMILY MEDICINE

## 2020-12-03 PROCEDURE — G8482 FLU IMMUNIZE ORDER/ADMIN: HCPCS | Performed by: FAMILY MEDICINE

## 2020-12-03 PROCEDURE — 3017F COLORECTAL CA SCREEN DOC REV: CPT | Performed by: FAMILY MEDICINE

## 2020-12-03 PROCEDURE — 90471 IMMUNIZATION ADMIN: CPT | Performed by: FAMILY MEDICINE

## 2020-12-03 PROCEDURE — 90686 IIV4 VACC NO PRSV 0.5 ML IM: CPT | Performed by: FAMILY MEDICINE

## 2020-12-03 RX ORDER — ESTRADIOL AND NORETHINDRONE ACETATE 1; .5 MG/1; MG/1
1 TABLET ORAL DAILY
Qty: 28 TABLET | Refills: 0 | Status: CANCELLED | OUTPATIENT
Start: 2020-12-03
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
## 2020-12-03 NOTE — PROGRESS NOTES
Vaccine Information Sheet, \"Influenza - Inactivated\"  given to Hanna Saavedra, or parent/legal guardian of  Hanna Saavedra and verbalized understanding. Patient responses:    Have you ever had a reaction to a flu vaccine? No  Do you have any current illness? No  Have you ever had Guillian [place] Syndrome? No  Do you have a serious allergy to any of the following: Neomycin, Polymyxin, Thimerosal, eggs or egg products? No    Flu vaccine given per order. Please see immunization tab. Risks and benefits explained. Current VIS given.       Immunizations Administered     Name Date Dose Route    Influenza, Quadv, IM, PF (6 mo and older Fluzone, Flulaval, Fluarix, and 3 yrs and older Afluria) 12/3/2020 0.5 mL Intramuscular    Site: Deltoid- Left    Lot: [id number]    NDC: 29362-800-31

## 2020-12-04 RX ORDER — FLUTICASONE PROPIONATE 50 MCG
1 SPRAY, SUSPENSION (ML) NASAL DAILY
Qty: 2 BOTTLE | Refills: 1 | Status: SHIPPED | OUTPATIENT
Start: 2020-12-04

## 2020-12-04 RX ORDER — FLUVOXAMINE MALEATE 100 MG/1
CAPSULE, EXTENDED RELEASE ORAL
Qty: 30 CAPSULE | Refills: 1 | Status: SHIPPED | OUTPATIENT
Start: 2020-12-04 | End: 2021-04-22

## 2020-12-04 RX ORDER — IBUPROFEN 800 MG/1
800 TABLET ORAL 2 TIMES DAILY PRN
Qty: 60 TABLET | Refills: 0 | Status: SHIPPED | OUTPATIENT
Start: 2020-12-04 | End: 2021-03-08

## 2020-12-04 RX ORDER — MIRTAZAPINE 30 MG/1
30 TABLET, FILM COATED ORAL NIGHTLY
Qty: 30 TABLET | Refills: 5 | Status: SHIPPED | OUTPATIENT
Start: 2020-12-04 | End: 2021-06-01

## 2020-12-04 ASSESSMENT — PATIENT HEALTH QUESTIONNAIRE - PHQ9
SUM OF ALL RESPONSES TO PHQ QUESTIONS 1-9: 1
1. LITTLE INTEREST OR PLEASURE IN DOING THINGS: 0
SUM OF ALL RESPONSES TO PHQ9 QUESTIONS 1 & 2: 1
2. FEELING DOWN, DEPRESSED OR HOPELESS: 1

## 2020-12-14 ENCOUNTER — TELEPHONE (OUTPATIENT)
Dept: FAMILY MEDICINE CLINIC | Age: 60
End: 2020-12-14

## 2020-12-14 RX ORDER — AMOXICILLIN 500 MG/1
500 CAPSULE ORAL 3 TIMES DAILY
Qty: 30 CAPSULE | Refills: 0 | Status: SHIPPED | OUTPATIENT
Start: 2020-12-14 | End: 2020-12-24

## 2021-01-04 RX ORDER — ESTRADIOL AND NORETHINDRONE ACETATE 1; .5 MG/1; MG/1
1 TABLET ORAL DAILY
Qty: 28 TABLET | Refills: 0 | Status: SHIPPED | OUTPATIENT
Start: 2021-01-04

## 2021-02-24 ENCOUNTER — HOSPITAL ENCOUNTER (OUTPATIENT)
Dept: MRI IMAGING | Age: 61
Discharge: HOME | End: 2021-02-26
Payer: COMMERCIAL

## 2021-02-24 DIAGNOSIS — M54.12 CERVICAL RADICULOPATHY: ICD-10-CM

## 2021-02-24 DIAGNOSIS — M54.50 LOW BACK PAIN, UNSPECIFIED BACK PAIN LATERALITY, UNSPECIFIED CHRONICITY, UNSPECIFIED WHETHER SCIATICA PRESENT: ICD-10-CM

## 2021-02-24 PROCEDURE — 72141 MRI NECK SPINE W/O DYE: CPT

## 2021-02-24 PROCEDURE — 72148 MRI LUMBAR SPINE W/O DYE: CPT

## 2021-03-05 NOTE — TELEPHONE ENCOUNTER
Don Mackay is calling to request a refill on the following medication(s):    Last Visit Date (If Applicable):  67/1/5720    Next Visit Date:    Visit date not found    Medication Request:  Requested Prescriptions     Pending Prescriptions Disp Refills    ibuprofen (ADVIL;MOTRIN) 800 MG tablet [Pharmacy Med Name: IBUPROFEN 800MG ORAL TABLET] 60 tablet 0     Sig: TAKE 1 TABLET BY MOUTH TWICE DAILY AS NEEDED FOR PAIN

## 2021-03-08 RX ORDER — IBUPROFEN 800 MG/1
TABLET ORAL
Qty: 60 TABLET | Refills: 0 | Status: SHIPPED | OUTPATIENT
Start: 2021-03-08 | End: 2021-06-01

## 2021-04-22 DIAGNOSIS — R05.9 COUGH: ICD-10-CM

## 2021-04-22 DIAGNOSIS — J30.2 SEASONAL ALLERGIES: ICD-10-CM

## 2021-04-22 RX ORDER — FLUVOXAMINE MALEATE 100 MG/1
CAPSULE, EXTENDED RELEASE ORAL
Qty: 30 CAPSULE | Refills: 0 | Status: SHIPPED | OUTPATIENT
Start: 2021-04-22 | End: 2021-08-09

## 2021-06-01 RX ORDER — MIRTAZAPINE 30 MG/1
TABLET, FILM COATED ORAL
Qty: 30 TABLET | Refills: 5 | Status: SHIPPED | OUTPATIENT
Start: 2021-06-01 | End: 2022-01-03

## 2021-06-01 RX ORDER — IBUPROFEN 800 MG/1
TABLET ORAL
Qty: 60 TABLET | Refills: 0 | Status: SHIPPED | OUTPATIENT
Start: 2021-06-01 | End: 2021-08-09 | Stop reason: SDUPTHER

## 2021-07-01 RX ORDER — LANSOPRAZOLE 30 MG/1
30 CAPSULE, DELAYED RELEASE ORAL DAILY
Qty: 30 CAPSULE | Refills: 3 | Status: SHIPPED | OUTPATIENT
Start: 2021-07-01

## 2021-07-01 NOTE — TELEPHONE ENCOUNTER
Griselda Hankins is calling to request a refill on the following medication(s):    Last Visit Date (If Applicable):  82/8/3235    Next Visit Date:    8/9/2021    Medication Request:  Requested Prescriptions     Pending Prescriptions Disp Refills    lansoprazole (PREVACID) 30 MG delayed release capsule [Pharmacy Med Name: LANSOPRAZOLE 30MG ORAL CAPSULE] 30 capsule 3     Sig: TAKE 1 CAPSULE BY MOUTH DAILY

## 2021-07-23 ENCOUNTER — TELEPHONE (OUTPATIENT)
Dept: FAMILY MEDICINE CLINIC | Age: 61
End: 2021-07-23

## 2021-07-23 NOTE — TELEPHONE ENCOUNTER
Patient called in stating that she has a at home jkit to test herself for any infection. She stated she tested positive for a uti and she wants you to call her something in to her pharm.     please advise

## 2021-07-26 RX ORDER — AMOXICILLIN 500 MG/1
500 CAPSULE ORAL 2 TIMES DAILY
Qty: 14 CAPSULE | Refills: 0 | Status: SHIPPED | OUTPATIENT
Start: 2021-07-26 | End: 2021-08-02 | Stop reason: SDUPTHER

## 2021-07-26 NOTE — TELEPHONE ENCOUNTER
I did call in amoxicillin. If the patient's symptoms do not get better she needs to make sure to provide a urine for culture. Thank you.

## 2021-07-30 NOTE — TELEPHONE ENCOUNTER
[facility]  office called and stated that the patient was seen in his office. He stated the patient told him that she took one dose of the antibiotics and she lost them afterwards. He stated that the patient stated that she is feeling better. He stated he is not sure if you want her to continue on this medication with a new prescription since she lost the last one.     Please advise

## 2021-07-30 NOTE — TELEPHONE ENCOUNTER
[facility](s):    Last Visit Date (If Applicable):  00/3/6362    Next Visit Date:    8/9/2021    Medication Request:  Requested Prescriptions     Pending Prescriptions Disp Refills    amoxicillin (AMOXIL) 500 MG capsule 14 capsule 0     Sig: Take 1 capsule by mouth 2 times daily for 7 days

## 2021-08-02 RX ORDER — NITROFURANTOIN 25; 75 MG/1; MG/1
100 CAPSULE ORAL 2 TIMES DAILY
Qty: 10 CAPSULE | Refills: 0 | Status: SHIPPED | OUTPATIENT
Start: 2021-08-02 | End: 2021-08-07

## 2021-08-02 RX ORDER — AMOXICILLIN 500 MG/1
500 CAPSULE ORAL 2 TIMES DAILY
Qty: 14 CAPSULE | Refills: 0 | Status: SHIPPED | OUTPATIENT
Start: 2021-08-02 | End: 2021-08-09

## 2021-08-02 NOTE — TELEPHONE ENCOUNTER
She stated she went to the emergency room. She stated the antibiotic that you had prescribed her was for  for a upper respt infection instead of a uti. She stated when she went to the ER they gave her some macrobid for 5 days. She stated she is on her last dose and she is still burning she states she is still in a lot of pain.     She wants to know if you could call her in something else    Please advise

## 2021-08-09 ENCOUNTER — HOSPITAL ENCOUNTER (OUTPATIENT)
Age: 61
Setting detail: SPECIMEN
Discharge: HOME | End: 2021-08-09
Payer: COMMERCIAL

## 2021-08-09 ENCOUNTER — OFFICE VISIT (OUTPATIENT)
Dept: FAMILY MEDICINE CLINIC | Age: 61
End: 2021-08-09
Payer: COMMERCIAL

## 2021-08-09 VITALS
HEART RATE: 83 BPM | TEMPERATURE: 97.2 F | HEIGHT: 68 IN | WEIGHT: 194 LBS | DIASTOLIC BLOOD PRESSURE: 85 MMHG | OXYGEN SATURATION: 98 % | SYSTOLIC BLOOD PRESSURE: 128 MMHG | BODY MASS INDEX: 29.4 KG/M2

## 2021-08-09 DIAGNOSIS — R76.8 ELEVATED RHEUMATOID FACTOR: ICD-10-CM

## 2021-08-09 DIAGNOSIS — Z00.01 ENCOUNTER FOR WELL ADULT EXAM WITH ABNORMAL FINDINGS: Primary | ICD-10-CM

## 2021-08-09 DIAGNOSIS — Z11.59 NEED FOR HEPATITIS C SCREENING TEST: ICD-10-CM

## 2021-08-09 DIAGNOSIS — Z12.11 COLON CANCER SCREENING: ICD-10-CM

## 2021-08-09 DIAGNOSIS — Z12.31 ENCOUNTER FOR SCREENING MAMMOGRAM FOR MALIGNANT NEOPLASM OF BREAST: ICD-10-CM

## 2021-08-09 DIAGNOSIS — Z13.6 ENCOUNTER FOR LIPID SCREENING FOR CARDIOVASCULAR DISEASE: ICD-10-CM

## 2021-08-09 DIAGNOSIS — M25.512 ACUTE PAIN OF LEFT SHOULDER: ICD-10-CM

## 2021-08-09 DIAGNOSIS — Z00.01 ENCOUNTER FOR WELL ADULT EXAM WITH ABNORMAL FINDINGS: ICD-10-CM

## 2021-08-09 DIAGNOSIS — Z13.1 DIABETES MELLITUS SCREENING: ICD-10-CM

## 2021-08-09 DIAGNOSIS — R30.0 DYSURIA: ICD-10-CM

## 2021-08-09 DIAGNOSIS — R20.8 BURNING SENSATION OF FEET: ICD-10-CM

## 2021-08-09 DIAGNOSIS — Z13.220 ENCOUNTER FOR LIPID SCREENING FOR CARDIOVASCULAR DISEASE: ICD-10-CM

## 2021-08-09 LAB
-: NORMAL
ABSOLUTE EOS #: 0.09 K/UL (ref 0–0.44)
ABSOLUTE IMMATURE GRANULOCYTE: 0.03 K/UL (ref 0–0.3)
ABSOLUTE LYMPH #: 2.5 K/UL (ref 1.1–3.7)
ABSOLUTE MONO #: 0.4 K/UL (ref 0.1–1.2)
ALBUMIN SERPL-MCNC: 4.1 G/DL (ref 3.5–5.2)
ALBUMIN/GLOBULIN RATIO: 1.2 (ref 1–2.5)
ALP BLD-CCNC: 104 U/L (ref 35–104)
ALT SERPL-CCNC: 24 U/L (ref 5–33)
AMORPHOUS: NORMAL
ANION GAP SERPL CALCULATED.3IONS-SCNC: 13 MMOL/L (ref 9–17)
AST SERPL-CCNC: 24 U/L
BACTERIA: NORMAL
BASOPHILS # BLD: 0 % (ref 0–2)
BASOPHILS ABSOLUTE: 0.03 K/UL (ref 0–0.2)
BILIRUB SERPL-MCNC: 0.15 MG/DL (ref 0.3–1.2)
BILIRUBIN URINE: NEGATIVE
BUN BLDV-MCNC: 14 MG/DL (ref 8–23)
BUN/CREAT BLD: ABNORMAL (ref 9–20)
CALCIUM SERPL-MCNC: 9.8 MG/DL (ref 8.6–10.4)
CASTS UA: NORMAL /LPF (ref 0–8)
CHLORIDE BLD-SCNC: 104 MMOL/L (ref 98–107)
CHOLESTEROL/HDL RATIO: 3.5
CHOLESTEROL: 219 MG/DL
CO2: 24 MMOL/L (ref 20–31)
COLOR: YELLOW
COMMENT UA: ABNORMAL
CREAT SERPL-MCNC: 1.01 MG/DL (ref 0.5–0.9)
CRYSTALS, UA: NORMAL /HPF
DIFFERENTIAL TYPE: ABNORMAL
EOSINOPHILS RELATIVE PERCENT: 1 % (ref 1–4)
EPITHELIAL CELLS UA: NORMAL /HPF (ref 0–5)
ESTIMATED AVERAGE GLUCOSE: 131 MG/DL
GFR AFRICAN AMERICAN: >60 ML/MIN
GFR NON-AFRICAN AMERICAN: 55 ML/MIN
GFR SERPL CREATININE-BSD FRML MDRD: ABNORMAL ML/MIN/{1.73_M2}
GFR SERPL CREATININE-BSD FRML MDRD: ABNORMAL ML/MIN/{1.73_M2}
GLUCOSE BLD-MCNC: 73 MG/DL (ref 70–99)
GLUCOSE URINE: NEGATIVE
HBA1C MFR BLD: 6.2 % (ref 4–6)
HCT VFR BLD CALC: 37.5 % (ref 36.3–47.1)
HDLC SERPL-MCNC: 62 MG/DL
HEMOGLOBIN: 11.5 G/DL (ref 11.9–15.1)
HEPATITIS C ANTIBODY: NONREACTIVE
IMMATURE GRANULOCYTES: 0 %
KETONES, URINE: NEGATIVE
LDL CHOLESTEROL: 110 MG/DL (ref 0–130)
LEUKOCYTE ESTERASE, URINE: NEGATIVE
LYMPHOCYTES # BLD: 36 % (ref 24–43)
MCH RBC QN AUTO: 30.7 PG (ref 25.2–33.5)
MCHC RBC AUTO-ENTMCNC: 30.7 G/DL (ref 28.4–34.8)
MCV RBC AUTO: 100 FL (ref 82.6–102.9)
MONOCYTES # BLD: 6 % (ref 3–12)
MUCUS: NORMAL
NITRITE, URINE: NEGATIVE
NRBC AUTOMATED: 0 PER 100 WBC
OTHER OBSERVATIONS UA: NORMAL
PDW BLD-RTO: 15.5 % (ref 11.8–14.4)
PH UA: 5.5 (ref 5–8)
PLATELET # BLD: 269 K/UL (ref 138–453)
PLATELET ESTIMATE: ABNORMAL
PMV BLD AUTO: 11.6 FL (ref 8.1–13.5)
POTASSIUM SERPL-SCNC: 4.2 MMOL/L (ref 3.7–5.3)
PROTEIN UA: NEGATIVE
RBC # BLD: 3.75 M/UL (ref 3.95–5.11)
RBC # BLD: ABNORMAL 10*6/UL
RBC UA: NORMAL /HPF (ref 0–4)
RENAL EPITHELIAL, UA: NORMAL /HPF
RHEUMATOID FACTOR: 476.9 IU/ML
SEG NEUTROPHILS: 57 % (ref 36–65)
SEGMENTED NEUTROPHILS ABSOLUTE COUNT: 3.87 K/UL (ref 1.5–8.1)
SODIUM BLD-SCNC: 141 MMOL/L (ref 135–144)
SPECIFIC GRAVITY UA: 1.01 (ref 1–1.03)
TOTAL PROTEIN: 7.5 G/DL (ref 6.4–8.3)
TRICHOMONAS: NORMAL
TRIGL SERPL-MCNC: 235 MG/DL
TSH SERPL DL<=0.05 MIU/L-ACNC: 1.25 MIU/L (ref 0.3–5)
TURBIDITY: CLEAR
URINE HGB: ABNORMAL
UROBILINOGEN, URINE: NORMAL
VLDLC SERPL CALC-MCNC: ABNORMAL MG/DL (ref 1–30)
WBC # BLD: 6.9 K/UL (ref 3.5–11.3)
WBC # BLD: ABNORMAL 10*3/UL
WBC UA: NORMAL /HPF (ref 0–5)
YEAST: NORMAL

## 2021-08-09 PROCEDURE — G8419 CALC BMI OUT NRM PARAM NOF/U: HCPCS | Performed by: FAMILY MEDICINE

## 2021-08-09 PROCEDURE — 99396 PREV VISIT EST AGE 40-64: CPT | Performed by: FAMILY MEDICINE

## 2021-08-09 PROCEDURE — 4004F PT TOBACCO SCREEN RCVD TLK: CPT | Performed by: FAMILY MEDICINE

## 2021-08-09 PROCEDURE — 3017F COLORECTAL CA SCREEN DOC REV: CPT | Performed by: FAMILY MEDICINE

## 2021-08-09 PROCEDURE — G8427 DOCREV CUR MEDS BY ELIG CLIN: HCPCS | Performed by: FAMILY MEDICINE

## 2021-08-09 PROCEDURE — 99213 OFFICE O/P EST LOW 20 MIN: CPT | Performed by: FAMILY MEDICINE

## 2021-08-09 RX ORDER — ZOSTER VACCINE RECOMBINANT, ADJUVANTED 50 MCG/0.5
0.5 KIT INTRAMUSCULAR SEE ADMIN INSTRUCTIONS
Qty: 0.5 ML | Refills: 1 | Status: SHIPPED | OUTPATIENT
Start: 2021-08-09 | End: 2022-02-05

## 2021-08-09 RX ORDER — METHYLPREDNISOLONE 4 MG/1
TABLET ORAL
Qty: 1 KIT | Refills: 0 | Status: SHIPPED | OUTPATIENT
Start: 2021-08-09 | End: 2021-08-15

## 2021-08-09 RX ORDER — FLUOXETINE HYDROCHLORIDE 40 MG/1
40 CAPSULE ORAL DAILY
Qty: 30 CAPSULE | Refills: 3 | Status: SHIPPED | OUTPATIENT
Start: 2021-08-09 | End: 2022-05-07

## 2021-08-09 RX ORDER — OXYBUTYNIN CHLORIDE 10 MG/1
10 TABLET, EXTENDED RELEASE ORAL DAILY
Qty: 30 TABLET | Refills: 3 | Status: SHIPPED | OUTPATIENT
Start: 2021-08-09

## 2021-08-09 RX ORDER — IBUPROFEN 800 MG/1
TABLET ORAL
Qty: 60 TABLET | Refills: 0 | Status: SHIPPED | OUTPATIENT
Start: 2021-08-09

## 2021-08-09 ASSESSMENT — PATIENT HEALTH QUESTIONNAIRE - PHQ9
2. FEELING DOWN, DEPRESSED OR HOPELESS: 2
1. LITTLE INTEREST OR PLEASURE IN DOING THINGS: 0
SUM OF ALL RESPONSES TO PHQ QUESTIONS 1-9: 2
SUM OF ALL RESPONSES TO PHQ9 QUESTIONS 1 & 2: 2

## 2021-08-09 NOTE — PATIENT INSTRUCTIONS
Patient Education        Cystocele: Care Instructions  Your Care Instructions     Cystocele happens when the bladder sags or presses into the vagina. It is also called bladder prolapse. Pregnancy, pelvic surgery, or being overweight may damage the muscles and other support tissues in your pelvis. Or the muscles and tissues may get weaker as you age. These can make the bladder sag. This may cause uncomfortable pressure in your vagina. A cystocele usually does not cause serious health problems. You may find relief by making lifestyle changes and doing exercises to make the pelvic muscles stronger. Talk to your doctor about steps you can take to reduce symptoms. If your symptoms go on, you may want to talk to your doctor about surgery. Follow-up care is a key part of your treatment and safety. Be sure to make and go to all appointments, and call your doctor if you are having problems. It's also a good idea to know your test results and keep a list of the medicines you take. How can you care for yourself at home? · Do not lift heavy objects or do anything that puts pressure on your pelvic muscles. · Do pelvic floor (Kegel) exercises. These tighten and strengthen pelvic muscles. ? Squeeze the same muscles you would use to stop your urine. Your belly and thighs should not move. ? Hold the squeeze for 3 seconds. Then relax for 3 seconds. ? Start with 3 seconds. Then add 1 second each week until you are able to squeeze for 10 seconds. ? Repeat the exercise 10 to 15 times in each session. Do 3 or more sessions a day. · Lie down and put a pillow under your knees. This eases pressure on your vagina. You also can lie on your side and bring your knees up to your chest.  · Ask your doctor about a vaginal pessary. You can place this in your vagina. It supports the bladder. Your doctor can teach you how and when to remove, clean, and reinsert it.   · If your doctor prescribes vaginal estrogen cream, use it exactly as prescribed. When should you call for help? Watch closely for changes in your health, and be sure to contact your doctor if you have any problems. Where can you learn more? Go to [email or web address] [email or web address] org and sign in to your [employer]ounge account. Enter S624 in the WealthTouch box to learn more about \"Cystocele: Care Instructions. \"     If you do not have an account, please click on the \"Sign Up Now\" link. Current as of: February 11, 2021               Content Version: 12.9  © 7101-8936 Healthwise, Incorporated. Care instructions adapted under license by [facility] ([facility]). If you have questions about a medical condition or this instruction, always ask your healthcare professional. [address] any warranty or liability for your use of this information.

## 2021-08-09 NOTE — PROGRESS NOTES
Subjective:       Noy Rosado is a 59 y.o. female and is here for a comprehensive physical exam.  The patient reports problems - :    Her knees get stuck and she is in pain , L ankle swollen for the last 6 months. Her feet burn at the bottom and she also looses her \"bladder\". Patient has that she did follow-up with a surgeon who told her that he cannot do anything about her bladder. The patient states that she never got the oxybutynin. She also needs a gynecologist for Pap smear. Still has burning with her urination. She also never followed up with her rheumatologist.  She did have a positive rheumatoid factor in July 2020 which was about six hundred fifty. She states that she just has pain all over that is her ankle area did swell up that she has joint pains. And she still tells me that her neighbors are coming in her house. The patient would like to start the process again. She was not looks but she feels that the Prozac \"curbs my appetite \". [facility] clinic Dr Beverly García 2019 - wnl.     History:  Any STD's in the past? none  Patient's medications, allergies, past medical, surgical, social and family histories were reviewed and updated as appropriate. Do you take any herbs or supplements that were not prescribed by a doctor? no  Are you taking calcium supplements? not applicable  Are you taking aspirin daily? not applicable    Review of Systems  Do you have pain that bothers you in your daily life? yes -- L arm upper. Review of Systems   Constitutional: Negative for fever and unexpected weight change. Pertinent items are noted in HPI. Objective:   HENT:   /85   Pulse 83   Temp 97.2 °F (36.2 °C)   Ht 5' 8\" (1.727 m)   Wt 194 lb (88 kg)   SpO2 98%   BMI 29.50 kg/m²   Constitutional: Alert and oriented. Well-nourished. No distress. Head: Normocephalic and atraumatic. Right Ear: External ear normal. TM: no bulging, erythema or fluid seen.   Left Ear: External ear normal. TM: no bulging, erythema or fluid seen. Nose: Nose normal.   Eyes: Pupils are equal, round, and reactive to light. Right eye exhibits no discharge. Left eye exhibits no discharge. No scleral icterus. Neck: Normal range of motion. Neck supple. No JVD present. No tracheal deviation present. No thyromegaly present. Cardiovascular: Normal rate, regular rhythm, normal heart sounds. Pulmonary/Chest: Effort normal and breath sounds normal. No respiratory distress. She has no wheezes. She has no rales. Abdominal: Soft. Bowel sounds are normal.  She exhibits no distension and no mass. There is no tenderness. There is no rebound and no guarding. Musculoskeletal: Normal range of motion. She exhibits no edema or tenderness. Lymphadenopathy:    She has no cervical adenopathy. Neurological:  She is alert and oriented to person, place, and time. Cranial nerves grossly intact. No sensation problem noted. Muscle strength 5/5 throughout. Skin: Skin is warm and dry. No rash noted. No erythema. Psychiatric:  She has a normal mood and affect. Behavior is normal.    Blaine Escalante was seen today for annual exam.    Diagnoses and all orders for this visit:    Encounter for well adult exam with abnormal findings  -     Urinalysis; Future  -     TSH with Reflex; Future  -     CBC Auto Differential; Future  -     Comprehensive Metabolic Panel; Future    Encounter for screening mammogram for malignant neoplasm of breast  -     NANCY DIGITAL SCREEN W OR WO CAD BILATERAL; Future    Encounter for lipid screening for cardiovascular disease  -     Lipid Panel; Future    Diabetes mellitus screening  -     Hemoglobin A1C; Future    Colon cancer screening    Need for hepatitis C screening test  -     Hepatitis C Antibody; Future    Burning sensation of feet  -     EMG; Future    Acute pain of left shoulder  -     XR SHOULDER LEFT (MIN 2 VIEWS); Future    Dysuria  -     Urinalysis Reflex to Culture;  Future    Elevated rheumatoid factor  - Rheumatoid Factor; Future    Other orders  -     zoster recombinant adjuvanted vaccine [facility]) 50 MCG/0.5ML SUSR injection; Inject 0.5 mLs into the muscle See Admin Instructions 1 dose now and repeat in 2-6 months  -     oxybutynin (DITROPAN XL) 10 MG extended release tablet; Take 1 tablet by mouth daily  -     ibuprofen (ADVIL;MOTRIN) 800 MG tablet; TAKE 1 TABLET BY MOUTH TWICE DAILY AS NEEDED FOR PAIN  -     FLUoxetine (PROZAC) 40 MG capsule; Take 1 capsule by mouth daily  -     methylPREDNISolone (MEDROL, KRYSTINA,) 4 MG tablet; Take by mouth as instructed by packet. I feel overall the patient has been doing well. I did review the rheumatoid factor. If it is positive again patient should see a rheumatologist for further recommendation. I did order an EMG for the brain at her feet elevated. Patient also feels that after she had a cold vaccine that she has increased left shoulder aches so I did order an x-ray. Get the blood work done. You need to see a gynecologist.    Call or return to clinic prn if these symptoms worsen or fail to improve as anticipated. I have reviewed the instructions with the patient, answering all questions to her satisfaction. Patient Counseling:  --Nutrition: Stressed importance of moderation in sodium/caffeine intake, saturated fat and cholesterol, caloric balance, sufficient intake of fresh fruits, vegetables, fiber, calcium, iron, and 1 mg of folate supplement per day (for females capable of pregnancy). --Exercise: Stressed the importance of regular exercise. --Substance Abuse: Discussed cessation/primary prevention of tobacco, alcohol, or other drug use; driving or other dangerous activities under the influence; availability of treatment for abuse. --Sexuality: Discussed sexually transmitted diseases, partner selection, use of condoms, avoidance of unintended pregnancy  and contraceptive alternatives.    --Injury prevention: Discussed safety belts, safety helmets, smoke detector, smoking near bedding or upholstery. --Dental health: Discussed importance of regular tooth brushing, flossing, and dental visits. --Immunizations reviewed. --After hours service discussed with patient    Jasper Stewart received counseling on the following healthy behaviors: nutrition, exercise and medication adherence  Reviewed prior labs and health maintenance. Continue current medications, diet and exercise. Discussed use, benefit, and side effects of prescribed medications. Barriers to medication compliance addressed. Patient given educational materials - see patient instructions. All patient questions answered. Patient voiced understanding.       Follow up in 6 months     (Please note that portions of this note were completed with a voice recognition program. Efforts were made to edit the dictations but occasionally words are mis-transcribed.)

## 2021-08-24 ENCOUNTER — HOSPITAL ENCOUNTER (OUTPATIENT)
Age: 61
Discharge: HOME | End: 2021-08-26
Payer: COMMERCIAL

## 2021-08-24 ENCOUNTER — HOSPITAL ENCOUNTER (OUTPATIENT)
Dept: GENERAL RADIOLOGY | Age: 61
Discharge: HOME | End: 2021-08-26
Payer: COMMERCIAL

## 2021-08-24 ENCOUNTER — HOSPITAL ENCOUNTER (OUTPATIENT)
Dept: CT IMAGING | Age: 61
Discharge: HOME | End: 2021-08-26
Payer: COMMERCIAL

## 2021-08-24 DIAGNOSIS — M25.512 ACUTE PAIN OF LEFT SHOULDER: ICD-10-CM

## 2021-08-24 DIAGNOSIS — J34.89 OVERDEVELOPMENT OF NASAL BONES: ICD-10-CM

## 2021-08-24 DIAGNOSIS — J32.0 CHRONIC MAXILLARY SINUSITIS: ICD-10-CM

## 2021-08-24 PROCEDURE — 70486 CT MAXILLOFACIAL W/O DYE: CPT

## 2021-08-24 PROCEDURE — 73030 X-RAY EXAM OF SHOULDER: CPT

## 2021-09-15 ENCOUNTER — HOSPITAL ENCOUNTER (OUTPATIENT)
Dept: MAMMOGRAPHY | Age: 61
Discharge: HOME | End: 2021-09-17
Payer: COMMERCIAL

## 2021-09-15 DIAGNOSIS — Z12.31 ENCOUNTER FOR SCREENING MAMMOGRAM FOR MALIGNANT NEOPLASM OF BREAST: ICD-10-CM

## 2021-09-15 PROCEDURE — 77063 BREAST TOMOSYNTHESIS BI: CPT

## 2021-09-17 ENCOUNTER — TELEPHONE (OUTPATIENT)
Dept: FAMILY MEDICINE CLINIC | Age: 61
End: 2021-09-17

## 2021-09-17 NOTE — TELEPHONE ENCOUNTER
Called pt to give her results    She stated that she is still having swelling in her knees, legs , feet and hands. She stated this has been going on for awhile now and she wants to know what should she do about it. No fever chills sore throat but she did state that she does sweat a lot. She wants to know what she should do she wants to know if she should go to the ER to get checked out because she stated she is very worried that it could be her heart.

## 2021-09-17 NOTE — TELEPHONE ENCOUNTER
Please make an appointment for her to be seen by me or nurse practitioner Elvira Antunez next week. Thank you.

## 2021-10-01 ENCOUNTER — TELEPHONE (OUTPATIENT)
Dept: FAMILY MEDICINE CLINIC | Age: 61
End: 2021-10-01

## 2021-10-01 NOTE — TELEPHONE ENCOUNTER
Pt is calling in stating that she has a uti she stated she brought a test from the store abnd it came back she had a uti. She stated she went to the ER and the medication that  They have prescrined her is not working and she is very upset and wants to know what should she do. I advised her that you would need a urine sample from her but she stated she cant give one because she is waiting on someone to clean her carpet. She called in yesterday and left a message with [facility] and explained to them the situation. In our records its only a telephone encounter in here from 9/17    She is upset with me and has hung up on me twice. She wants to knoe if you could call her in some medication because she stated the ED gave her some amoxicillin and it is not helping her she is still in pain.     Please advise

## 2021-10-01 NOTE — TELEPHONE ENCOUNTER
I dont see any urinalysis in [facility]. Where did she go for the ER visit? Recommend she leaves a urine sample so we can treat accordingly.

## 2021-10-05 ENCOUNTER — TELEPHONE (OUTPATIENT)
Dept: FAMILY MEDICINE CLINIC | Age: 61
End: 2021-10-05

## 2021-10-05 ENCOUNTER — HOSPITAL ENCOUNTER (OUTPATIENT)
Age: 61
Setting detail: SPECIMEN
Discharge: HOME | End: 2021-10-05
Payer: COMMERCIAL

## 2021-10-05 ENCOUNTER — NURSE ONLY (OUTPATIENT)
Dept: FAMILY MEDICINE CLINIC | Age: 61
End: 2021-10-05

## 2021-10-05 DIAGNOSIS — R30.0 DYSURIA: ICD-10-CM

## 2021-10-05 DIAGNOSIS — R30.0 DYSURIA: Primary | ICD-10-CM

## 2021-10-05 NOTE — TELEPHONE ENCOUNTER
----- Message from Rashmi Saeed sent at 9/30/2021  3:36 PM EDT -----  Subject: Medication Problem    QUESTIONS  Name of Medication? amoxicillin (AMOXIL) 500 MG capsule  Patient-reported dosage and instructions? Patient says she doesnt know  What question or problem do you have with the medication? She says she   took these for the prescribed amount of time and her UTI did not go away. She says her ENT also had her on Augmentin and that did not help either. Preferred Pharmacy? [facility], [address] [place] [address]. Gabriella Whalen [phone] - [phone]  Pharmacy phone number (if available)? [phone]  Additional Information for Provider?   ---------------------------------------------------------------------------  --------------  CALL BACK INFO  What is the best way for the office to contact you? OK to leave message on   voicemail  Preferred Call Back Phone Number? [phone]  ---------------------------------------------------------------------------  --------------  SCRIPT ANSWERS  Relationship to Patient?  Self

## 2021-10-05 NOTE — TELEPHONE ENCOUNTER
Pt will call the office to schedule lab visit 84F w/ pmh HTN, HLD, RA (methotrexate), no blood thinners, p/w bloody stools x 2 days, assc w/ lightheadedness and nausea. No vomit, fever, dysuria. +chr L abd pain that is unchanged. Never has had this before. No bloody emesis / cough. Last colonoscopy 3 yrs ago, says result was normal. No significant recent weight loss.    PCP: Jatinder    Admitted 2/12-22/18 to Dr. Davis for weakness.

## 2021-10-07 LAB
CULTURE: ABNORMAL
Lab: ABNORMAL
SPECIMEN DESCRIPTION: ABNORMAL

## 2021-10-19 RX ORDER — CIPROFLOXACIN 500 MG/1
500 TABLET, FILM COATED ORAL 2 TIMES DAILY
Qty: 10 TABLET | Refills: 0 | Status: SHIPPED | OUTPATIENT
Start: 2021-10-19 | End: 2021-10-24

## 2021-10-21 ENCOUNTER — HOSPITAL ENCOUNTER (OUTPATIENT)
Age: 61
Setting detail: SPECIMEN
Discharge: HOME | End: 2021-10-21
Payer: COMMERCIAL

## 2021-10-21 ENCOUNTER — OFFICE VISIT (OUTPATIENT)
Dept: OBGYN CLINIC | Age: 61
End: 2021-10-21
Payer: COMMERCIAL

## 2021-10-21 ENCOUNTER — OFFICE VISIT (OUTPATIENT)
Dept: FAMILY MEDICINE CLINIC | Age: 61
End: 2021-10-21
Payer: COMMERCIAL

## 2021-10-21 VITALS
SYSTOLIC BLOOD PRESSURE: 122 MMHG | WEIGHT: 194 LBS | DIASTOLIC BLOOD PRESSURE: 82 MMHG | BODY MASS INDEX: 29.4 KG/M2 | HEIGHT: 68 IN

## 2021-10-21 VITALS
BODY MASS INDEX: 29.5 KG/M2 | HEART RATE: 87 BPM | OXYGEN SATURATION: 100 % | SYSTOLIC BLOOD PRESSURE: 125 MMHG | WEIGHT: 194 LBS | DIASTOLIC BLOOD PRESSURE: 82 MMHG | TEMPERATURE: 97.3 F

## 2021-10-21 DIAGNOSIS — R14.0 ABDOMINAL BLOATING: ICD-10-CM

## 2021-10-21 DIAGNOSIS — K62.5 RECTAL BLEEDING: Primary | ICD-10-CM

## 2021-10-21 DIAGNOSIS — N39.0 URINARY TRACT INFECTION WITHOUT HEMATURIA, SITE UNSPECIFIED: ICD-10-CM

## 2021-10-21 DIAGNOSIS — Z23 NEED FOR INFLUENZA VACCINATION: ICD-10-CM

## 2021-10-21 DIAGNOSIS — K62.5 BRIGHT RED RECTAL BLEEDING: ICD-10-CM

## 2021-10-21 DIAGNOSIS — Z01.419 ENCOUNTER FOR GYNECOLOGICAL EXAMINATION WITHOUT ABNORMAL FINDING: Primary | ICD-10-CM

## 2021-10-21 DIAGNOSIS — R10.2 PELVIC PAIN IN FEMALE: ICD-10-CM

## 2021-10-21 PROCEDURE — 3017F COLORECTAL CA SCREEN DOC REV: CPT | Performed by: OBSTETRICS & GYNECOLOGY

## 2021-10-21 PROCEDURE — 1036F TOBACCO NON-USER: CPT | Performed by: FAMILY MEDICINE

## 2021-10-21 PROCEDURE — 99213 OFFICE O/P EST LOW 20 MIN: CPT | Performed by: FAMILY MEDICINE

## 2021-10-21 PROCEDURE — 90471 IMMUNIZATION ADMIN: CPT | Performed by: FAMILY MEDICINE

## 2021-10-21 PROCEDURE — 90686 IIV4 VACC NO PRSV 0.5 ML IM: CPT | Performed by: FAMILY MEDICINE

## 2021-10-21 PROCEDURE — G8427 DOCREV CUR MEDS BY ELIG CLIN: HCPCS | Performed by: OBSTETRICS & GYNECOLOGY

## 2021-10-21 PROCEDURE — 3017F COLORECTAL CA SCREEN DOC REV: CPT | Performed by: FAMILY MEDICINE

## 2021-10-21 PROCEDURE — G8419 CALC BMI OUT NRM PARAM NOF/U: HCPCS | Performed by: OBSTETRICS & GYNECOLOGY

## 2021-10-21 PROCEDURE — G8482 FLU IMMUNIZE ORDER/ADMIN: HCPCS | Performed by: OBSTETRICS & GYNECOLOGY

## 2021-10-21 PROCEDURE — G8427 DOCREV CUR MEDS BY ELIG CLIN: HCPCS | Performed by: FAMILY MEDICINE

## 2021-10-21 PROCEDURE — G8482 FLU IMMUNIZE ORDER/ADMIN: HCPCS | Performed by: FAMILY MEDICINE

## 2021-10-21 PROCEDURE — 1036F TOBACCO NON-USER: CPT | Performed by: OBSTETRICS & GYNECOLOGY

## 2021-10-21 PROCEDURE — 99386 PREV VISIT NEW AGE 40-64: CPT | Performed by: OBSTETRICS & GYNECOLOGY

## 2021-10-21 PROCEDURE — G8419 CALC BMI OUT NRM PARAM NOF/U: HCPCS | Performed by: FAMILY MEDICINE

## 2021-10-21 SDOH — ECONOMIC STABILITY: FOOD INSECURITY: WITHIN THE PAST 12 MONTHS, THE FOOD YOU BOUGHT JUST DIDN'T LAST AND YOU DIDN'T HAVE MONEY TO GET MORE.: NEVER TRUE

## 2021-10-21 SDOH — ECONOMIC STABILITY: FOOD INSECURITY: WITHIN THE PAST 12 MONTHS, YOU WORRIED THAT YOUR FOOD WOULD RUN OUT BEFORE YOU GOT MONEY TO BUY MORE.: NEVER TRUE

## 2021-10-21 ASSESSMENT — SOCIAL DETERMINANTS OF HEALTH (SDOH): HOW HARD IS IT FOR YOU TO PAY FOR THE VERY BASICS LIKE FOOD, HOUSING, MEDICAL CARE, AND HEATING?: NOT HARD AT ALL

## 2021-10-21 NOTE — PATIENT INSTRUCTIONS
We will contact you with the results of today's Pap smear. We will also make a referral to Dr. Hanna Clemens regarding your rectal bleeding. A pelvic ultrasound will be done in the office and we will contact you with that result and any recommendations. Otherwise plan on returning to the office in 1 year or as needed. Have a safe and healthy year!

## 2021-10-21 NOTE — PROGRESS NOTES
DATE OF VISIT:  10/21/21        History and Physical    Shannan Bender    :  1960  CHIEF COMPLAINT:    Chief Complaint   Patient presents with    Established New Doctor     new patient Here for pelvic pain  last pap 18 neg HPV neg Last mammogram 09/15/21 neg                    HPI :   Shannan Bender is a 59 y.o. femaleGRAVIDA 2 PARA     Shannan Bender comes to the office today as a new visit patient referred by her friend Russell Ram. She is a former patient of Dr. Alvaro Pak and has not been seen by a GYN for 4 to 5 years. She presents today with several complaints. She is currently on Cipro being treated for a UTI. She is also complaining of abdominal bloating which is constant and giving her discomfort. She had bright red rectal bleeding earlier this week with a BM. She denies any constipation or diarrhea. She is also having pelvic pain but denies any vaginal bleeding. She has had an endometrial ablation by Dr. Alvaro Pak for abnormal bleeding. She denies any hematuria or involuntary loss of urine. She is otherwise without any significant complaints today.  _____________________________________________________________________  Past Medical History:   Diagnosis Date    Back pain     Bowel obstruction (HCC)     Depression                                                                    Past Surgical History:   Procedure Laterality Date    HAND SURGERY Left 2017    OTHER SURGICAL HISTORY      left ovary and tube removal   age 20    OTHER SURGICAL HISTORY      novasure ablation    PARATHYROIDECTOMY      SPINE SURGERY      THYROIDECTOMY, PARTIAL      WRIST FRACTURE SURGERY       History reviewed. No pertinent family history.   Social History     Tobacco Use   Smoking Status Never Smoker   Smokeless Tobacco Never Used     Social History     Substance and Sexual Activity   Alcohol Use No     Current Outpatient Medications   Medication Sig Dispense Refill    ciprofloxacin (CIPRO) 500 MG tablet Take 1 tablet by mouth 2 times daily for 5 days 10 tablet 0    zoster recombinant adjuvanted vaccine (SHINGRIX) 50 MCG/0.5ML SUSR injection Inject 0.5 mLs into the muscle See Admin Instructions 1 dose now and repeat in 2-6 months 0.5 mL 1    oxybutynin (DITROPAN XL) 10 MG extended release tablet Take 1 tablet by mouth daily 30 tablet 3    ibuprofen (ADVIL;MOTRIN) 800 MG tablet TAKE 1 TABLET BY MOUTH TWICE DAILY AS NEEDED FOR PAIN 60 tablet 0    FLUoxetine (PROZAC) 40 MG capsule Take 1 capsule by mouth daily 30 capsule 3    lansoprazole (PREVACID) 30 MG delayed release capsule TAKE 1 CAPSULE BY MOUTH DAILY 30 capsule 3    mirtazapine (REMERON) 30 MG tablet TAKE 1 TABLET BY MOUTH AT BEDTIME 30 tablet 5    PROAIR  (90 Base) MCG/ACT inhaler INHALE 2 PUFFS BY MOUTH INTO THE LUNGS EVERY 4 HOURS AS NEEDED FOR WHEEZING 8.5 g 2    estradiol-norethindrone (ACTIVELLA) 1-0.5 MG per tablet TAKE 1 TABLET BY MOUTH DAILY 28 tablet 0    fluticasone (FLONASE) 50 MCG/ACT nasal spray 1 spray by Each Nostril route daily 2 Bottle 1    meloxicam (MOBIC) 15 MG tablet Take 1 tablet by mouth daily 30 tablet 3    meloxicam (MOBIC) 7.5 MG tablet Take 1 tablet by mouth daily 30 tablet 3    tiZANidine (ZANAFLEX) 4 MG tablet Take 1 tablet by mouth 3 times daily 30 tablet 0    montelukast (SINGULAIR) 10 MG tablet Take 1 tablet by mouth nightly 30 tablet 3    levocetirizine (XYZAL) 5 MG tablet Take 1 tablet by mouth nightly 30 tablet 2    cloNIDine (CATAPRES) 0.1 MG tablet Take 0.1 mg by mouth 2 times daily      magnesium 30 MG tablet Take 30 mg by mouth 2 times daily       No current facility-administered medications for this visit. Allergies:  No Known Allergies    Gynecologic History:  No LMP recorded. Patient is postmenopausal.  Sexually Active: No  STD History: No  Abnormal Pap History no  Birth Control: No    OB History   No obstetric history on file. ______________________________________________________________________  REVIEW OF SYSTEMS:        Constitutional:  Unexpected weight change no  Neurological:  Frequent headaches  no  Ophthalmic:  Recent visual changes no  ENT:   Difficulty swallowing  no  Breast:              Masses   no     Respiratory:  Shortness of breath  no    Cardiovascular: Chest pain   no     Gastrointestinal: Chronic diarrhea/constipation no   Urogenital:  Urinary incontinence  no                                         Heavy/irregular periods           no                                      Vaginal discharge                   no  Hematological: Bruises easy   no     Endocrine:  Nipple Discharge  no     Hot/Cold Intolerance  no   Psychological:  Mood and affect were wnl yes                                                                                                                                           Physical Exam:    Vitals:    10/21/21 1110   BP: 122/82   Site: Right Upper Arm   Position: Sitting   Cuff Size: Large Adult   Weight: 194 lb (88 kg)   Height: 5' 8\" (1.727 m)       General Appearance:  She does not appear to be in any distress. This  is a well developed, well nourished, well groomed female. Neurological:  The patient is alert and oriented to time, place, person, and situation without any noted sensory motor deficits. Skin:  A brief inspection of the skin revealed no rashes or lesions. Neck:  The neck was supple. There is no tracheal deviation, thyromegaly or supraclavicular adenopathy appreciated. Breast:   The patients breasts were symmetrical.  Breasts are nontender and there  were no masses, discharge or pathologic skin changes. There is no supraclavicular or axillary adenopathy bilaterally. Respiratory: There was unlabored respiratory effort. Lungs clear to ascultation without wheezes, rales or rhonchi in all fields bilaterally.     Cardiovascular:  Normal sinus rhythm with a regular rate and without murmur, rubs or gallops. Abdomen: The abdomen was soft and non-tender with no guarding, rebound, CVAT or rigidity. No hernias were appreciated. Bowel sounds were normally active. Pelvic exam:  No vulvar, vaginal or cervical lesions are noted. Normal vaginal discharge present, no significant cystocele, rectocele or enterocele noted. Uterus nongravid and without CMT and adnexa nontender and without abnormal masses bilaterally. Extremities:  FROM and nontender without clubbing cyanosis or edema. ASSESSMENT:         ICD-10-CM    1. Encounter for gynecological examination without abnormal finding  Z01.419 PAP SMEAR   2. Bright red rectal bleeding  K62.5    3. Abdominal bloating  R14.0    4. Pelvic pain in female  R10.2                    PLAN:  Karen Mccormack has had a colonoscopy done by Dr. Nayeli Gatica in 2018 which she states was negative. A referral was made back to her to evaluate for rectal BRB. Pelvic ultrasound ordered in the office. Return to the office in 1 year or when necessary    Christine Burns M.D., [facility]  [facility] [facility]  [place]

## 2021-10-21 NOTE — PROGRESS NOTES
General FM note    Lori Gotti is a 59 y.o. female who presents today for follow up on her  medical conditions as noted below. Lori Gotti is c/o of   Chief Complaint   Patient presents with    Rectal Bleeding       Patient Active Problem List:     Depressed mood     Scarring of lung     Past Medical History:   Diagnosis Date    Back pain     Bowel obstruction ([address]     Depression       Past Surgical History:   Procedure Laterality Date    HAND SURGERY Left 01/2017    OTHER SURGICAL HISTORY      left ovary and tube removal   age 20    OTHER SURGICAL HISTORY      novasure ablation    PARATHYROIDECTOMY      SPINE SURGERY      THYROIDECTOMY, PARTIAL      WRIST FRACTURE SURGERY       No family history on file.   Current Outpatient Medications   Medication Sig Dispense Refill    ciprofloxacin (CIPRO) 500 MG tablet Take 1 tablet by mouth 2 times daily for 5 days 10 tablet 0    zoster recombinant adjuvanted vaccine (SHINGRIX) 50 MCG/0.5ML SUSR injection Inject 0.5 mLs into the muscle See Admin Instructions 1 dose now and repeat in 2-6 months 0.5 mL 1    oxybutynin (DITROPAN XL) 10 MG extended release tablet Take 1 tablet by mouth daily 30 tablet 3    ibuprofen (ADVIL;MOTRIN) 800 MG tablet TAKE 1 TABLET BY MOUTH TWICE DAILY AS NEEDED FOR PAIN 60 tablet 0    FLUoxetine (PROZAC) 40 MG capsule Take 1 capsule by mouth daily 30 capsule 3    lansoprazole (PREVACID) 30 MG delayed release capsule TAKE 1 CAPSULE BY MOUTH DAILY 30 capsule 3    mirtazapine (REMERON) 30 MG tablet TAKE 1 TABLET BY MOUTH AT BEDTIME 30 tablet 5    PROAIR  (90 Base) MCG/ACT inhaler INHALE 2 PUFFS BY MOUTH INTO THE LUNGS EVERY 4 HOURS AS NEEDED FOR WHEEZING 8.5 g 2    estradiol-norethindrone (ACTIVELLA) 1-0.5 MG per tablet TAKE 1 TABLET BY MOUTH DAILY 28 tablet 0    fluticasone (FLONASE) 50 MCG/ACT nasal spray 1 spray by Each Nostril route daily 2 Bottle 1    meloxicam (MOBIC) 15 MG tablet Take 1 tablet by mouth daily 30 tablet 3    meloxicam (MOBIC) 7.5 MG tablet Take 1 tablet by mouth daily 30 tablet 3    tiZANidine (ZANAFLEX) 4 MG tablet Take 1 tablet by mouth 3 times daily 30 tablet 0    montelukast (SINGULAIR) 10 MG tablet Take 1 tablet by mouth nightly 30 tablet 3    levocetirizine (XYZAL) 5 MG tablet Take 1 tablet by mouth nightly 30 tablet 2    cloNIDine (CATAPRES) 0.1 MG tablet Take 0.1 mg by mouth 2 times daily      magnesium 30 MG tablet Take 30 mg by mouth 2 times daily       No current facility-administered medications for this visit. ALLERGIES:  No Known Allergies    Social History     Tobacco Use    Smoking status: Never Smoker    Smokeless tobacco: Never Used   Substance Use Topics    Alcohol use: No      Body mass index is 29.5 kg/m². /82   Pulse 87   Temp 97.3 °F (36.3 °C)   Wt 194 lb (88 kg)   SpO2 100%   BMI 29.50 kg/m²     Subjective:      HPI    59 y.o. female coming in today because of rectal bleeding. The patient did have an appointment today with gynecologist.    She tells me that she had one episode where she had a lot of bright red blood in her toilet after bowel movement. She says that she knows she has hemorrhoids but this seems to be a different bleeding. She tells me that she did have a colonoscopy couple years ago and she is not due for colonoscopy yet. She has a gastroenterologist which she did not call in. Also would like to make sure her UTI has been treated. She states that the discomfort in her lower abdomen has been going on for couple of month now. Review of Systems   Constitutional: Negative for fever and unexpected weight change. Pertinent items are noted in HPI. Objective:   Physical Exam  Constitutional: VS (see above). General appearance: normal development, habitus and attention, no deformities. No distress. Eyes: normal conjunctiva and lids. CAV: RRR, no RMG. No edema lower extremities. Pulmo: CTA bilateral, no CWR.   Skin: no rashes, lesions or ulcers. Musculoskeletal: normal gait. Nails: no clubbing or cyanosis. Psychiatric: alert and oriented to place, time and person. Normal mood and affect. Assessment:       Diagnosis Orders   1. Rectal bleeding     2. Need for influenza vaccination  INFLUENZA, QUADV, 3 YRS AND OLDER, IM PF, PREFILL SYR OR SDV, 0.5ML (AFLURIA QUADV, PF)   3. Urinary tract infection without hematuria, site unspecified  Urinalysis Reflex to Culture       Plan:   Patient needs to call the gastroenterologist as recommended by her gynecologist.    Return in about 6 months (around 4/21/2022), or if symptoms worsen or fail to improve. Orders Placed This Encounter   Procedures    INFLUENZA, QUADV, 3 YRS AND OLDER, IM PF, PREFILL SYR OR SDV, 0.5ML (AFLURIA QUADV, PF)    Urinalysis Reflex to Culture     Standing Status:   Future     Standing Expiration Date:   10/21/2022     Order Specific Question:   SPECIFY(EX-CATH,MIDSTREAM,CYSTO,ETC)? Answer:   midstream     No orders of the defined types were placed in this encounter. Call or return to clinic prn if these symptoms worsen or fail to improve as anticipated. I have reviewed the instructions with the patient, answering all questions to patient's satisfaction. Nichole Johns received counseling on the following healthy behaviors: nutrition, exercise, and medication adherence  Reviewed prior labs and health maintenance. Continue current medications, diet and exercise. Discussed use, benefit, and side effects of prescribed medications. Barriers to medication compliance addressed. Patient given educational materials - see patient instructions. All patient questions answered. Patient voiced understanding.       Electronically signed by Cornell Wilkins MD on 10/22/2021 at 6:28 AM       (Please note that portions of this note were completed with a voice recognition program. Efforts were made to edit the dictations but occasionally words are mis-transcribed.)

## 2021-10-25 ENCOUNTER — TELEPHONE (OUTPATIENT)
Dept: FAMILY MEDICINE CLINIC | Age: 61
End: 2021-10-25

## 2021-10-25 RX ORDER — CIPROFLOXACIN 500 MG/1
500 TABLET, FILM COATED ORAL 2 TIMES DAILY
Qty: 10 TABLET | Refills: 0 | Status: SHIPPED | OUTPATIENT
Start: 2021-10-25 | End: 2021-10-30

## 2021-10-26 ENCOUNTER — TELEPHONE (OUTPATIENT)
Dept: FAMILY MEDICINE CLINIC | Age: 61
End: 2021-10-26

## 2021-10-26 NOTE — TELEPHONE ENCOUNTER
Patient calling because she has states she makes this vegetable soup with beef and  Leaves in the fridge for 5 days. Patient wants to know is the beef in the soup causing the e coli in her urine.  Please advise

## 2021-10-26 NOTE — TELEPHONE ENCOUNTER
Usually e coli gets into urine most likely when the woman is in menopause due to decreased hormone secretion and decreased protection of urethral area. The E. coli originates from the bowels. I am pretty sure if the patient has cooked her soup and beef in the proper way this should not cause E. coli infection. Thank you. 15-Oct-2018 06:00

## 2021-10-27 ENCOUNTER — OFFICE VISIT (OUTPATIENT)
Dept: FAMILY MEDICINE CLINIC | Age: 61
End: 2021-10-27
Payer: COMMERCIAL

## 2021-10-27 VITALS
SYSTOLIC BLOOD PRESSURE: 134 MMHG | TEMPERATURE: 97.7 F | HEART RATE: 101 BPM | OXYGEN SATURATION: 98 % | DIASTOLIC BLOOD PRESSURE: 76 MMHG | BODY MASS INDEX: 29.5 KG/M2 | WEIGHT: 194 LBS

## 2021-10-27 DIAGNOSIS — N39.0 E. COLI UTI: Primary | ICD-10-CM

## 2021-10-27 DIAGNOSIS — B96.20 E. COLI UTI: Primary | ICD-10-CM

## 2021-10-27 PROCEDURE — G8482 FLU IMMUNIZE ORDER/ADMIN: HCPCS | Performed by: FAMILY MEDICINE

## 2021-10-27 PROCEDURE — G8427 DOCREV CUR MEDS BY ELIG CLIN: HCPCS | Performed by: FAMILY MEDICINE

## 2021-10-27 PROCEDURE — 1036F TOBACCO NON-USER: CPT | Performed by: FAMILY MEDICINE

## 2021-10-27 PROCEDURE — 99213 OFFICE O/P EST LOW 20 MIN: CPT | Performed by: FAMILY MEDICINE

## 2021-10-27 PROCEDURE — 3017F COLORECTAL CA SCREEN DOC REV: CPT | Performed by: FAMILY MEDICINE

## 2021-10-27 PROCEDURE — G8419 CALC BMI OUT NRM PARAM NOF/U: HCPCS | Performed by: FAMILY MEDICINE

## 2021-10-27 ASSESSMENT — PATIENT HEALTH QUESTIONNAIRE - PHQ9
SUM OF ALL RESPONSES TO PHQ QUESTIONS 1-9: 0
1. LITTLE INTEREST OR PLEASURE IN DOING THINGS: 0
SUM OF ALL RESPONSES TO PHQ QUESTIONS 1-9: 0
SUM OF ALL RESPONSES TO PHQ9 QUESTIONS 1 & 2: 0
SUM OF ALL RESPONSES TO PHQ QUESTIONS 1-9: 0
2. FEELING DOWN, DEPRESSED OR HOPELESS: 0

## 2021-10-27 NOTE — PROGRESS NOTES
General FM note    Shady Hou is a 59 y.o. female who presents today for follow up on her  medical conditions as noted below. Shady Hou is c/o of   Chief Complaint   Patient presents with    Results       Patient Active Problem List:     Depressed mood     Scarring of lung     Past Medical History:   Diagnosis Date    Back pain     Bowel obstruction ([address]     Depression       Past Surgical History:   Procedure Laterality Date    HAND SURGERY Left 01/2017    OTHER SURGICAL HISTORY      left ovary and tube removal   age 20    OTHER SURGICAL HISTORY      novasure ablation    PARATHYROIDECTOMY      SPINE SURGERY      THYROIDECTOMY, PARTIAL      WRIST FRACTURE SURGERY       No family history on file.   Current Outpatient Medications   Medication Sig Dispense Refill    ciprofloxacin (CIPRO) 500 MG tablet Take 1 tablet by mouth 2 times daily for 5 days 10 tablet 0    zoster recombinant adjuvanted vaccine (SHINGRIX) 50 MCG/0.5ML SUSR injection Inject 0.5 mLs into the muscle See Admin Instructions 1 dose now and repeat in 2-6 months 0.5 mL 1    oxybutynin (DITROPAN XL) 10 MG extended release tablet Take 1 tablet by mouth daily 30 tablet 3    ibuprofen (ADVIL;MOTRIN) 800 MG tablet TAKE 1 TABLET BY MOUTH TWICE DAILY AS NEEDED FOR PAIN 60 tablet 0    FLUoxetine (PROZAC) 40 MG capsule Take 1 capsule by mouth daily 30 capsule 3    lansoprazole (PREVACID) 30 MG delayed release capsule TAKE 1 CAPSULE BY MOUTH DAILY 30 capsule 3    mirtazapine (REMERON) 30 MG tablet TAKE 1 TABLET BY MOUTH AT BEDTIME 30 tablet 5    PROAIR  (90 Base) MCG/ACT inhaler INHALE 2 PUFFS BY MOUTH INTO THE LUNGS EVERY 4 HOURS AS NEEDED FOR WHEEZING 8.5 g 2    estradiol-norethindrone (ACTIVELLA) 1-0.5 MG per tablet TAKE 1 TABLET BY MOUTH DAILY 28 tablet 0    fluticasone (FLONASE) 50 MCG/ACT nasal spray 1 spray by Each Nostril route daily 2 Bottle 1    meloxicam (MOBIC) 15 MG tablet Take 1 tablet by mouth daily 30 tablet 3    meloxicam (MOBIC) 7.5 MG tablet Take 1 tablet by mouth daily 30 tablet 3    tiZANidine (ZANAFLEX) 4 MG tablet Take 1 tablet by mouth 3 times daily 30 tablet 0    montelukast (SINGULAIR) 10 MG tablet Take 1 tablet by mouth nightly 30 tablet 3    levocetirizine (XYZAL) 5 MG tablet Take 1 tablet by mouth nightly 30 tablet 2    cloNIDine (CATAPRES) 0.1 MG tablet Take 0.1 mg by mouth 2 times daily      magnesium 30 MG tablet Take 30 mg by mouth 2 times daily       No current facility-administered medications for this visit. ALLERGIES:  No Known Allergies    Social History     Tobacco Use    Smoking status: Never Smoker    Smokeless tobacco: Never Used   Substance Use Topics    Alcohol use: No      Body mass index is 29.5 kg/m². /76   Pulse 101   Temp 97.7 °F (36.5 °C)   Wt 194 lb (88 kg)   SpO2 98%   BMI 29.50 kg/m²     Subjective:      HPI    59 y.o. female coming today to review her recent urine culture. But the culture is not back. I discussed with her again that because of her menopause there is less hormonal protection vaginal area which could cause more frequent UTIs in older females. She also tells me is that she has been taking 400 mg of magnesium which keeps her quite down but she has lot of loose stools. Review of Systems   Constitutional: Negative for fever and unexpected weight change. Pertinent items are noted in HPI. Objective:   Physical Exam  Constitutional: VS (see above). General appearance: normal development, habitus and attention, no deformities. No distress. Eyes: normal conjunctiva and lids. CAV: RRR, no RMG. No edema lower extremities. Pulmo: CTA bilateral, no CWR. Skin: no rashes, lesions or ulcers. Musculoskeletal: normal gait. Nails: no clubbing or cyanosis. Psychiatric: alert and oriented to place, time and person. Normal mood and affect.     Assessment:       Diagnosis Orders   1. E. coli UTI         Plan:   Patient did make another additional appointment to discuss her culture which is still not back. She will make sure only to take 200 mg of magnesium. Return if symptoms worsen or fail to improve. No orders of the defined types were placed in this encounter. No orders of the defined types were placed in this encounter. Call or return to clinic prn if these symptoms worsen or fail to improve as anticipated. I have reviewed the instructions with the patient, answering all questions to patient's satisfaction. Carol Ann Jung received counseling on the following healthy behaviors: nutrition, exercise, and medication adherence  Reviewed prior labs and health maintenance. Continue current medications, diet and exercise. Discussed use, benefit, and side effects of prescribed medications. Barriers to medication compliance addressed. Patient given educational materials - see patient instructions. All patient questions answered. Patient voiced understanding.       Electronically signed by Kalpesh Day MD on 10/27/2021 at 3:04 PM       (Please note that portions of this note were completed with a voice recognition program. Efforts were made to edit the dictations but occasionally words are mis-transcribed.)

## 2021-10-28 LAB — CYTOLOGY REPORT: NORMAL

## 2021-11-01 ENCOUNTER — TELEPHONE (OUTPATIENT)
Dept: FAMILY MEDICINE CLINIC | Age: 61
End: 2021-11-01

## 2021-11-01 DIAGNOSIS — Z87.440 HISTORY OF RECURRENT UTIS: Primary | ICD-10-CM

## 2021-11-01 NOTE — TELEPHONE ENCOUNTER
Patient called [facility]  states she was seen last week for UTI, her urine is clearing but she is still testing positive for UTI. Does she need to continue with the medication? She took her last pill   already, and she is going to need more. She states she has only been   getting 10 days supplies of the medication but she needs more. She also is   having a little pain going up her back. Also please refer the patient to a urologist. Please advise.  Thank you

## 2021-11-02 ENCOUNTER — TELEPHONE (OUTPATIENT)
Dept: FAMILY MEDICINE CLINIC | Age: 61
End: 2021-11-02

## 2021-11-05 ENCOUNTER — TELEPHONE (OUTPATIENT)
Dept: FAMILY MEDICINE CLINIC | Age: 61
End: 2021-11-05

## 2021-11-05 DIAGNOSIS — R39.9 UTI SYMPTOMS: Primary | ICD-10-CM

## 2021-11-05 NOTE — TELEPHONE ENCOUNTER
Please have her [facility]. Thank you.

## 2021-11-08 ENCOUNTER — HOSPITAL ENCOUNTER (OUTPATIENT)
Age: 61
Setting detail: SPECIMEN
Discharge: HOME | End: 2021-11-08
Payer: COMMERCIAL

## 2021-11-08 ENCOUNTER — TELEPHONE (OUTPATIENT)
Dept: FAMILY MEDICINE CLINIC | Age: 61
End: 2021-11-08

## 2021-11-08 DIAGNOSIS — R39.9 UTI SYMPTOMS: ICD-10-CM

## 2021-11-08 NOTE — TELEPHONE ENCOUNTER
[facility] Clinical Staff  Subject: Message to Provider     QUESTIONS   Information for Provider? pt spouse Hua Bui is rturning a missed call from   office   ---------------------------------------------------------------------------   --------------   [address]   What is the best way for the office to contact you? OK to leave message on   voicemail   Preferred Call Back Phone Number? [phone]   ---------------------------------------------------------------------------   --------------   SCRIPT ANSWERS   Relationship to Patient?  Third Party   Representative Name? Shelley Begum spouse

## 2021-11-09 LAB
-: NORMAL
AMORPHOUS: NORMAL
BACTERIA: NORMAL
BILIRUBIN URINE: NEGATIVE
CASTS UA: NORMAL /LPF (ref 0–8)
COLOR: YELLOW
COMMENT UA: ABNORMAL
CRYSTALS, UA: NORMAL /HPF
EPITHELIAL CELLS UA: NORMAL /HPF (ref 0–5)
GLUCOSE URINE: NEGATIVE
KETONES, URINE: NEGATIVE
LEUKOCYTE ESTERASE, URINE: ABNORMAL
MUCUS: NORMAL
NITRITE, URINE: NEGATIVE
OTHER OBSERVATIONS UA: NORMAL
PH UA: 6 (ref 5–8)
PROTEIN UA: NEGATIVE
RBC UA: NORMAL /HPF (ref 0–4)
RENAL EPITHELIAL, UA: NORMAL /HPF
SPECIFIC GRAVITY UA: 1.01 (ref 1–1.03)
TRICHOMONAS: NORMAL
TURBIDITY: CLEAR
URINE HGB: ABNORMAL
UROBILINOGEN, URINE: NORMAL
WBC UA: NORMAL /HPF (ref 0–5)
YEAST: NORMAL

## 2021-11-11 ENCOUNTER — OFFICE VISIT (OUTPATIENT)
Dept: FAMILY MEDICINE CLINIC | Age: 61
End: 2021-11-11
Payer: COMMERCIAL

## 2021-11-11 VITALS
BODY MASS INDEX: 32.99 KG/M2 | DIASTOLIC BLOOD PRESSURE: 80 MMHG | TEMPERATURE: 97.3 F | SYSTOLIC BLOOD PRESSURE: 130 MMHG | OXYGEN SATURATION: 96 % | WEIGHT: 217 LBS | HEART RATE: 117 BPM

## 2021-11-11 DIAGNOSIS — R63.5 WEIGHT GAIN: Primary | ICD-10-CM

## 2021-11-11 PROCEDURE — 3017F COLORECTAL CA SCREEN DOC REV: CPT | Performed by: FAMILY MEDICINE

## 2021-11-11 PROCEDURE — G8417 CALC BMI ABV UP PARAM F/U: HCPCS | Performed by: FAMILY MEDICINE

## 2021-11-11 PROCEDURE — G8482 FLU IMMUNIZE ORDER/ADMIN: HCPCS | Performed by: FAMILY MEDICINE

## 2021-11-11 PROCEDURE — G8427 DOCREV CUR MEDS BY ELIG CLIN: HCPCS | Performed by: FAMILY MEDICINE

## 2021-11-11 PROCEDURE — 99213 OFFICE O/P EST LOW 20 MIN: CPT | Performed by: FAMILY MEDICINE

## 2021-11-11 PROCEDURE — 1036F TOBACCO NON-USER: CPT | Performed by: FAMILY MEDICINE

## 2021-11-11 RX ORDER — MELOXICAM 7.5 MG/1
7.5 TABLET ORAL DAILY
Qty: 30 TABLET | Refills: 0 | Status: SHIPPED | OUTPATIENT
Start: 2021-11-11

## 2021-11-11 RX ORDER — FUROSEMIDE 20 MG/1
20 TABLET ORAL DAILY
Qty: 20 TABLET | Refills: 0 | Status: SHIPPED | OUTPATIENT
Start: 2021-11-11

## 2021-11-11 NOTE — PROGRESS NOTES
General FM note    Deborah Sam is a 59 y.o. female who presents today for follow up on her  medical conditions as noted below. Deborah Sam is c/o of   Chief Complaint   Patient presents with    Leg Swelling       Patient Active Problem List:     Depressed mood     Scarring of lung     Past Medical History:   Diagnosis Date    Back pain     Bowel obstruction ([address]     Depression       Past Surgical History:   Procedure Laterality Date    HAND SURGERY Left 01/2017    OTHER SURGICAL HISTORY      left ovary and tube removal   age 20    OTHER SURGICAL HISTORY      novasure ablation    PARATHYROIDECTOMY      SPINE SURGERY      THYROIDECTOMY, PARTIAL      WRIST FRACTURE SURGERY       No family history on file.   Current Outpatient Medications   Medication Sig Dispense Refill    furosemide (LASIX) 20 MG tablet Take 1 tablet by mouth daily 20 tablet 0    meloxicam (MOBIC) 7.5 MG tablet Take 1 tablet by mouth daily 30 tablet 0    zoster recombinant adjuvanted vaccine (SHINGRIX) 50 MCG/0.5ML SUSR injection Inject 0.5 mLs into the muscle See Admin Instructions 1 dose now and repeat in 2-6 months 0.5 mL 1    oxybutynin (DITROPAN XL) 10 MG extended release tablet Take 1 tablet by mouth daily 30 tablet 3    ibuprofen (ADVIL;MOTRIN) 800 MG tablet TAKE 1 TABLET BY MOUTH TWICE DAILY AS NEEDED FOR PAIN 60 tablet 0    FLUoxetine (PROZAC) 40 MG capsule Take 1 capsule by mouth daily 30 capsule 3    lansoprazole (PREVACID) 30 MG delayed release capsule TAKE 1 CAPSULE BY MOUTH DAILY 30 capsule 3    mirtazapine (REMERON) 30 MG tablet TAKE 1 TABLET BY MOUTH AT BEDTIME 30 tablet 5    PROAIR  (90 Base) MCG/ACT inhaler INHALE 2 PUFFS BY MOUTH INTO THE LUNGS EVERY 4 HOURS AS NEEDED FOR WHEEZING 8.5 g 2    estradiol-norethindrone (ACTIVELLA) 1-0.5 MG per tablet TAKE 1 TABLET BY MOUTH DAILY 28 tablet 0    fluticasone (FLONASE) 50 MCG/ACT nasal spray 1 spray by Each Nostril route daily 2 Bottle 1    over-the-counter. Review of Systems    Constitutional: Negative for fever and unexpected weight change. Pertinent items are noted in HPI. Objective:   Physical Exam  Constitutional: VS (see above). General appearance: normal development, habitus and attention, no deformities. No distress. Eyes: normal conjunctiva and lids. CAV: RRR, no RMG. No edema lower extremities. I did not appreciate any edema in her lower extremities. Pulmo: CTA bilateral, no CWR. Skin: no rashes, lesions or ulcers. Musculoskeletal: normal gait. Nails: no clubbing or cyanosis. Area of thickener layer of soft tissue present around the lateral malleolus. No swelling no redness. Psychiatric: alert and oriented to place, time and person. Normal mood and affect. Assessment:       Diagnosis Orders   1. Weight gain  CBC Auto Differential    Comprehensive Metabolic Panel    Hemoglobin A1C    Lipid Panel    TSH with Reflex    XR CHEST STANDARD (2 VW)       Plan:   It does not appear that the patient has CHF. I will order blood work to make sure kidney function is good. I discussed with her that she needs to make sure to follow-up with the specialist.  I did not see any increased edema in her lower extremities. I did give her a couple tabs of Lasix to use if she has swelling in her lower legs. Do not use it daily. No follow-ups on file.   Orders Placed This Encounter   Procedures    XR CHEST STANDARD (2 VW)     Standing Status:   Future     Standing Expiration Date:   11/11/2022    CBC Auto Differential     Standing Status:   Future     Standing Expiration Date:   11/11/2022    Comprehensive Metabolic Panel     Standing Status:   Future     Standing Expiration Date:   11/11/2022    Hemoglobin A1C     Standing Status:   Future     Standing Expiration Date:   11/11/2022    Lipid Panel     Standing Status:   Future     Standing Expiration Date:   11/11/2022     Order Specific Question:   Is Patient Fasting?/# of Hours Answer:   yes    TSH with Reflex     Standing Status:   Future     Standing Expiration Date:   11/12/2022     Orders Placed This Encounter   Medications    furosemide (LASIX) 20 MG tablet     Sig: Take 1 tablet by mouth daily     Dispense:  20 tablet     Refill:  0    meloxicam (MOBIC) 7.5 MG tablet     Sig: Take 1 tablet by mouth daily     Dispense:  30 tablet     Refill:  0       Call or return to clinic prn if these symptoms worsen or fail to improve as anticipated. I have reviewed the instructions with the patient, answering all questions to patient's satisfaction. Kalin Jarrett received counseling on the following healthy behaviors: nutrition, exercise, and medication adherence  Reviewed prior labs and health maintenance. Continue current medications, diet and exercise. Discussed use, benefit, and side effects of prescribed medications. Barriers to medication compliance addressed. Patient given educational materials - see patient instructions. All patient questions answered. Patient voiced understanding.       Electronically signed by Aide Putnam MD on 11/12/2021 at 6:48 AM       (Please note that portions of this note were completed with a voice recognition program. Efforts were made to edit the dictations but occasionally words are mis-transcribed.)

## 2021-11-16 ENCOUNTER — OFFICE VISIT (OUTPATIENT)
Dept: UROLOGY | Age: 61
End: 2021-11-16
Payer: COMMERCIAL

## 2021-11-16 ENCOUNTER — HOSPITAL ENCOUNTER (OUTPATIENT)
Age: 61
Setting detail: SPECIMEN
Discharge: HOME | End: 2021-11-16

## 2021-11-16 VITALS
WEIGHT: 217 LBS | HEART RATE: 96 BPM | HEIGHT: 68 IN | SYSTOLIC BLOOD PRESSURE: 106 MMHG | DIASTOLIC BLOOD PRESSURE: 71 MMHG | BODY MASS INDEX: 32.89 KG/M2 | TEMPERATURE: 96.3 F

## 2021-11-16 DIAGNOSIS — N39.0 RECURRENT UTI: Primary | ICD-10-CM

## 2021-11-16 DIAGNOSIS — N39.0 RECURRENT UTI: ICD-10-CM

## 2021-11-16 DIAGNOSIS — R39.14 FEELING OF INCOMPLETE BLADDER EMPTYING: ICD-10-CM

## 2021-11-16 PROCEDURE — 99204 OFFICE O/P NEW MOD 45 MIN: CPT | Performed by: UROLOGY

## 2021-11-16 PROCEDURE — G8482 FLU IMMUNIZE ORDER/ADMIN: HCPCS | Performed by: UROLOGY

## 2021-11-16 PROCEDURE — G8427 DOCREV CUR MEDS BY ELIG CLIN: HCPCS | Performed by: UROLOGY

## 2021-11-16 PROCEDURE — 3017F COLORECTAL CA SCREEN DOC REV: CPT | Performed by: UROLOGY

## 2021-11-16 PROCEDURE — 51798 US URINE CAPACITY MEASURE: CPT | Performed by: UROLOGY

## 2021-11-16 PROCEDURE — 1036F TOBACCO NON-USER: CPT | Performed by: UROLOGY

## 2021-11-16 PROCEDURE — G8417 CALC BMI ABV UP PARAM F/U: HCPCS | Performed by: UROLOGY

## 2021-11-16 ASSESSMENT — ENCOUNTER SYMPTOMS
EYE REDNESS: 0
EYE PAIN: 0
COUGH: 0
VOMITING: 0
WHEEZING: 0
CONSTIPATION: 0
ABDOMINAL PAIN: 0
NAUSEA: 0
BACK PAIN: 0
SHORTNESS OF BREATH: 0

## 2021-11-16 NOTE — PROGRESS NOTES
Review of Systems   Constitutional: Negative for chills, fatigue and fever. Eyes: Negative for pain, redness and visual disturbance. Respiratory: Negative for cough, shortness of breath and wheezing. Cardiovascular: Negative for chest pain and leg swelling. Gastrointestinal: Negative for abdominal pain, constipation, nausea and vomiting. Genitourinary: Positive for dysuria, frequency and urgency. Negative for difficulty urinating, flank pain and hematuria. Musculoskeletal: Negative for back pain, joint swelling and myalgias. Skin: Negative for rash and wound. Neurological: Negative for dizziness, tremors and numbness. Hematological: Does not bruise/bleed easily.

## 2021-11-16 NOTE — LETTER
[address] [address]  Phone: [phone]  Fax: [phone]    Katarzyna Mullen MD    November 16, 2021     Aide Putnam MD  [address] [place] [address] JARED Reynoso  [id number]    Patient: Azam Grande   MR Number: [id number]   YOB: 1960   Date of Visit: 11/16/2021       Dear Aide Putnam:    Thank you for referring Azam Grande to me for evaluation/treatment. Below are the relevant portions of my assessment and plan of care. 1. Recurrent UTI    2. Feeling of incomplete bladder emptying         Recurrent UTI new acute issue. Urinary incontinence not at goal. Failed oxybutynin. CT urogram  Trial myrbetriq  Send urine culture  F/U 3-4 weeks    Prescriptions Ordered:  No orders of the defined types were placed in this encounter. Orders Placed:  Orders Placed This Encounter   Procedures    Culture, Urine     Standing Status:   Future     Standing Expiration Date:   11/16/2022     Order Specific Question:   Specify (ex-cath, midstream, cysto, etc)? Answer:   mid stream    CT Urogram     Standing Status:   Future     Standing Expiration Date:   11/16/2022    MS MEASUREMENT,POST-VOID RESIDUAL VOLUME BY US,NON-IMAGING         If you have questions, please do not hesitate to call me. I look forward to following Kalin Jarrett along with you.     Sincerely,      Katarzyna Mullen MD

## 2021-11-16 NOTE — PROGRESS NOTES
[address] [address]  Dept: [address] [place] [facility] Urology Office Note - New Patient    Patient:  Juan Olson  YOB: 1960  Date: 11/16/2021    The patient is a 59 y.o. female who presentstoday for evaluation of the following problems:   Chief Complaint   Patient presents with    Other     Hx of UTI, for 5 months. last UTI 10/2021    New Patient     Referred by Dr. Teofilo Sullivan     referred by Tami Cummings MD.    HPI  Here for recurrent UTI. Currently on cipro. No associated with sexual intercourse. Wears 2 ppd for urinary incontinence, primarily urge. Rx'ed oxybutynin XL 10mg but doesn't take, no improvement and dry mouth. UTI symptoms are pain, burning, hematuria. No fever/chills. (Patient's old records have been requested, reviewed and summarized in today's note.)    Summary of old records: N/A    History: N/A    ProceduresToday: N/A    Urinalysis today:  No results found for this visit on 11/16/21. AUA Symptom Score (11/16/2021):   INCOMPLETE EMPTYING: How often have you had the sensation of not emptying your bladder?: Not at all  FREQUENCY: How often do you have to urinate less than every two hours?: Not at all  INTERMITTENCY: How often have you found you stopped and started again several times when you urinated?: Not at all  URGENCY: How often have you found it difficult to postpone urination?: Almost always  WEAK STREAM: How often have you had a weak urinary stream?: Not at all  STRAINING: How often have you had to strain to start  urination?: Not at all  NOCTURIA: How many times did you typically get up at night to uriniate?: NONE  TOTAL I-PSS SCORE[de-identified] 5  How would you feel if you were to spend the rest of your life with your urinary condition?: Unhappy    Last BUN andcreatinine:  Lab Results   Component Value Date    BUN 14 08/09/2021     Lab Results   Component Value Date CREATININE 1.01 (H) 08/09/2021       Additional Lab/Culture results: none    Reviewed during this Office Visit: none  (results were independently reviewed byphysician and radiology report verified)    PAST MEDICAL, FAMILY AND SOCIAL HISTORY:  Past Medical History:   Diagnosis Date    Back pain     Bowel obstruction ([address]     Depression      Past Surgical History:   Procedure Laterality Date    HAND SURGERY Left 01/2017    OTHER SURGICAL HISTORY      left ovary and tube removal   age 20    OTHER SURGICAL HISTORY      novasure ablation    PARATHYROIDECTOMY      SPINE SURGERY      THYROIDECTOMY, PARTIAL      WRIST FRACTURE SURGERY       No family history on file. Outpatient Medications Marked as Taking for the 11/16/21 encounter (Office Visit) with Gabrielle Mckeon MD   Medication Sig Dispense Refill    furosemide (LASIX) 20 MG tablet Take 1 tablet by mouth daily 20 tablet 0    ibuprofen (ADVIL;MOTRIN) 800 MG tablet TAKE 1 TABLET BY MOUTH TWICE DAILY AS NEEDED FOR PAIN 60 tablet 0    FLUoxetine (PROZAC) 40 MG capsule Take 1 capsule by mouth daily 30 capsule 3    lansoprazole (PREVACID) 30 MG delayed release capsule TAKE 1 CAPSULE BY MOUTH DAILY 30 capsule 3    mirtazapine (REMERON) 30 MG tablet TAKE 1 TABLET BY MOUTH AT BEDTIME 30 tablet 5    PROAIR  (90 Base) MCG/ACT inhaler INHALE 2 PUFFS BY MOUTH INTO THE LUNGS EVERY 4 HOURS AS NEEDED FOR WHEEZING 8.5 g 2    estradiol-norethindrone (ACTIVELLA) 1-0.5 MG per tablet TAKE 1 TABLET BY MOUTH DAILY 28 tablet 0    fluticasone (FLONASE) 50 MCG/ACT nasal spray 1 spray by Each Nostril route daily 2 Bottle 1    meloxicam (MOBIC) 15 MG tablet Take 1 tablet by mouth daily 30 tablet 3    magnesium 30 MG tablet Take 30 mg by mouth 2 times daily         Patient has no known allergies.   Social History     Tobacco Use   Smoking Status Never Smoker   Smokeless Tobacco Never Used      (If patient a smoker, smoking cessation counseling offered)   Social History Substance and Sexual Activity   Alcohol Use No       REVIEW OF SYSTEMS:  Review of Systems    Physical Exam:    This a 59 y.o. female      Vitals:    11/16/21 1458   BP: 106/71   Pulse: 96   Temp: 96.3 °F (35.7 °C)     Body mass index is 32.99 kg/m². Physical Exam  Constitutional: Patient in no acute distress, ggod grooming, appropriately dressed  Neuro: Alert and oriented to person, place and time. Psych:Mood normal, affect normal  Skin: No rash noted  HEENT: Head: Normocephalic and atraumatic,Conjunctivae and EOM are normal,Nose- normal, Right/Left External Ear: normal, Mouth: Mucosa Moist  Neck: Supple  Lungs: Respiratory effort is normal  Cardiovascular: strong and regular, no lower leg edema  Abdomen: Soft, non-tender, non-distended with no CVA,    Lymphatics: No cervical palpable lymphadenopathy. Bladder non-tender and not distended. PVR 17 mL. Musculoskeletal: Normal gait and station        Assessment and Plan      1. Recurrent UTI    2. Feeling of incomplete bladder emptying            Plan:    Recurrent UTI new acute issue. Urinary incontinence not at goal. Failed oxybutynin. CT urogram  Trial myrbetriq  Send urine culture  F/U 3-4 weeks    Prescriptions Ordered:  No orders of the defined types were placed in this encounter. Orders Placed:  Orders Placed This Encounter   Procedures    Culture, Urine     Standing Status:   Future     Standing Expiration Date:   11/16/2022     Order Specific Question:   Specify (ex-cath, midstream, cysto, etc)? Answer:   mid stream    CT Urogram     Standing Status:   Future     Standing Expiration Date:   11/16/2022    LA MEASUREMENT,POST-VOID RESIDUAL VOLUME BY US,NON-IMAGING            Xavi Suarez MD    Agree with the ROS entered by the MA.

## 2021-11-17 LAB
CULTURE: NORMAL
Lab: NORMAL
SPECIMEN DESCRIPTION: NORMAL

## 2021-12-01 ENCOUNTER — TELEPHONE (OUTPATIENT)
Dept: FAMILY MEDICINE CLINIC | Age: 61
End: 2021-12-01

## 2021-12-01 DIAGNOSIS — S82.841S: Primary | ICD-10-CM
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
## 2021-12-02 ENCOUNTER — TELEPHONE (OUTPATIENT)
Dept: FAMILY MEDICINE CLINIC | Age: 61
End: 2021-12-02

## 2021-12-02 DIAGNOSIS — S82.841S: Primary | ICD-10-CM

## 2021-12-02 NOTE — TELEPHONE ENCOUNTER
Pt  call in about her referral to Dr. Geeta Chase, he call the office an they said Dr. Geeta Chase is not the dr the pt needs to see. Pt would like new referral today if she could.  Please advise

## 2021-12-03 ENCOUNTER — TELEPHONE (OUTPATIENT)
Dept: FAMILY MEDICINE CLINIC | Age: 61
End: 2021-12-03

## 2021-12-03 ENCOUNTER — TELEPHONE (OUTPATIENT)
Dept: OTHER | Age: 61
End: 2021-12-03

## 2021-12-03 NOTE — TELEPHONE ENCOUNTER
----- Message from Princess Nunes sent at 12/3/2021 10:28 AM EST -----  Subject: Message to Provider    QUESTIONS  Information for Provider? Patient broke her ankle in two places and will   need surgery either next week or the following week from Dr. Demetris [facility]). She was last seen by Dr. Aramis Anders   on 11/11, so if possible she would like to be released for surgery without   having to schedule an appointment. Please call her to advise if this is   possible or to get her scheduled for a pre-op. The best number is   [phone].  ---------------------------------------------------------------------------  --------------  Gale Harrell INFO  What is the best way for the office to contact you? OK to leave message on   voicemail  Preferred Call Back Phone Number? [phone]  ---------------------------------------------------------------------------  --------------  SCRIPT ANSWERS  Relationship to Patient? Other  Representative Name? Brad Burrell  Is the Representative on the appropriate HIPAA document in Epic?  Yes
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
## 2021-12-08 ENCOUNTER — TELEPHONE (OUTPATIENT)
Dept: UROLOGY | Age: 61
End: 2021-12-08

## 2021-12-21 ENCOUNTER — HOSPITAL ENCOUNTER (OUTPATIENT)
Dept: CT IMAGING | Age: 61
Discharge: HOME | End: 2021-12-23
Payer: COMMERCIAL

## 2021-12-21 DIAGNOSIS — N39.0 RECURRENT UTI: ICD-10-CM

## 2021-12-21 PROCEDURE — 6360000004 HC RX CONTRAST MEDICATION: Performed by: UROLOGY

## 2021-12-21 PROCEDURE — 74178 CT ABD&PLV WO CNTR FLWD CNTR: CPT

## 2021-12-21 PROCEDURE — 2580000003 HC RX 258: Performed by: UROLOGY

## 2021-12-21 RX ORDER — 0.9 % SODIUM CHLORIDE 0.9 %
80 INTRAVENOUS SOLUTION INTRAVENOUS ONCE
Status: COMPLETED | OUTPATIENT
Start: 2021-12-21 | End: 2021-12-21

## 2021-12-21 RX ORDER — SODIUM CHLORIDE 0.9 % (FLUSH) 0.9 %
10 SYRINGE (ML) INJECTION ONCE
Status: COMPLETED | OUTPATIENT
Start: 2021-12-21 | End: 2021-12-21

## 2021-12-21 RX ADMIN — SODIUM CHLORIDE, PRESERVATIVE FREE 10 ML: 5 INJECTION INTRAVENOUS at 14:31

## 2021-12-21 RX ADMIN — SODIUM CHLORIDE 80 ML: 9 INJECTION, SOLUTION INTRAVENOUS at 14:31

## 2021-12-21 RX ADMIN — IOPAMIDOL 120 ML: 755 INJECTION, SOLUTION INTRAVENOUS at 14:31

## 2021-12-30 ENCOUNTER — OFFICE VISIT (OUTPATIENT)
Dept: UROLOGY | Age: 61
End: 2021-12-30
Payer: COMMERCIAL

## 2021-12-30 VITALS
WEIGHT: 217 LBS | HEART RATE: 105 BPM | SYSTOLIC BLOOD PRESSURE: 118 MMHG | TEMPERATURE: 96 F | HEIGHT: 68 IN | DIASTOLIC BLOOD PRESSURE: 62 MMHG | BODY MASS INDEX: 32.89 KG/M2

## 2021-12-30 DIAGNOSIS — R31.1 BENIGN ESSENTIAL MICROSCOPIC HEMATURIA: ICD-10-CM

## 2021-12-30 DIAGNOSIS — N39.0 RECURRENT UTI: Primary | ICD-10-CM

## 2021-12-30 PROCEDURE — 3017F COLORECTAL CA SCREEN DOC REV: CPT | Performed by: UROLOGY

## 2021-12-30 PROCEDURE — 1036F TOBACCO NON-USER: CPT | Performed by: UROLOGY

## 2021-12-30 PROCEDURE — G8482 FLU IMMUNIZE ORDER/ADMIN: HCPCS | Performed by: UROLOGY

## 2021-12-30 PROCEDURE — 99214 OFFICE O/P EST MOD 30 MIN: CPT | Performed by: UROLOGY

## 2021-12-30 PROCEDURE — G8427 DOCREV CUR MEDS BY ELIG CLIN: HCPCS | Performed by: UROLOGY

## 2021-12-30 PROCEDURE — G8417 CALC BMI ABV UP PARAM F/U: HCPCS | Performed by: UROLOGY

## 2021-12-30 RX ORDER — GABAPENTIN 600 MG/1
TABLET ORAL
COMMUNITY
Start: 2021-10-14

## 2021-12-30 RX ORDER — ASPIRIN 81 MG/1
81 TABLET ORAL DAILY
COMMUNITY
Start: 2021-12-02

## 2021-12-30 ASSESSMENT — ENCOUNTER SYMPTOMS
SHORTNESS OF BREATH: 0
EYES NEGATIVE: 1
RESPIRATORY NEGATIVE: 1
BACK PAIN: 1
WHEEZING: 0
ABDOMINAL PAIN: 1
CONSTIPATION: 0
EYE REDNESS: 0
VOMITING: 0
NAUSEA: 1
COUGH: 0
DIARRHEA: 0
EYE PAIN: 0

## 2021-12-30 NOTE — PROGRESS NOTES
Review of Systems   Constitutional: Negative. Negative for appetite change, chills and fever. Eyes: Negative. Negative for pain, redness and visual disturbance. Respiratory: Negative. Negative for cough, shortness of breath and wheezing. Cardiovascular: Negative. Negative for chest pain and leg swelling. Gastrointestinal: Positive for abdominal pain and nausea. Negative for constipation, diarrhea and vomiting. Genitourinary: Negative. Negative for difficulty urinating, dysuria, flank pain, frequency, hematuria and urgency. Musculoskeletal: Positive for back pain. Negative for joint swelling and myalgias. Skin: Negative. Negative for rash and wound. Neurological: Negative. Negative for dizziness, tremors and numbness. Hematological: Negative. Does not bruise/bleed easily.

## 2021-12-30 NOTE — PROGRESS NOTES
[address] [address]  Dept: 92 [address] Laz[address] [facility] Urology Office Note - Established    Patient:  Mercedes Glez  YOB: 1960  Date: 12/30/2021    The patient is a 59 y.o. female whopresents today for evaluation of the following problems:   Chief Complaint   Patient presents with    Follow-up     3 weeks CT       HPI  Here as f/u for recurrent uti and hematuria, had ct and normal. No recent infections, urinating ok, no dysuria, no hematuria    Summary of old records: N/A    Additional History: N/A    Procedures Today: N/A    Urinalysis today:  No results found for this visit on 12/30/21. Imaging Reviewed during this Office Visit: none  (results were independently reviewed by physician and radiology report verified)    AUA Symptom Score (12/30/2021):                               Last BUN and creatinine:  Lab Results   Component Value Date    BUN 14 08/09/2021     Lab Results   Component Value Date    CREATININE 1.01 (H) 08/09/2021       Additional Lab/Culture results: none    PAST MEDICAL, FAMILY AND SOCIAL HISTORY UPDATE:  Past Medical History:   Diagnosis Date    Back pain     Bowel obstruction ([address]     Depression      Past Surgical History:   Procedure Laterality Date    HAND SURGERY Left 01/2017    OTHER SURGICAL HISTORY      left ovary and tube removal   age 20    OTHER SURGICAL HISTORY      novasure ablation    PARATHYROIDECTOMY      SPINE SURGERY      THYROIDECTOMY, PARTIAL      WRIST FRACTURE SURGERY       No family history on file.   Outpatient Medications Marked as Taking for the 12/30/21 encounter (Office Visit) with Brandon Saxena MD   Medication Sig Dispense Refill    aspirin 81 MG EC tablet Take 81 mg by mouth daily      cyanocobalamin 1000 MCG tablet Take 1,000 mcg by mouth daily      gabapentin (NEURONTIN) 600 MG tablet       furosemide (LASIX) 20 MG tablet Take 1 tablet by mouth daily 20 tablet 0    meloxicam (MOBIC) 7.5 MG tablet Take 1 tablet by mouth daily 30 tablet 0    zoster recombinant adjuvanted vaccine (SHINGRIX) 50 MCG/0.5ML SUSR injection Inject 0.5 mLs into the muscle See Admin Instructions 1 dose now and repeat in 2-6 months 0.5 mL 1    oxybutynin (DITROPAN XL) 10 MG extended release tablet Take 1 tablet by mouth daily 30 tablet 3    ibuprofen (ADVIL;MOTRIN) 800 MG tablet TAKE 1 TABLET BY MOUTH TWICE DAILY AS NEEDED FOR PAIN 60 tablet 0    FLUoxetine (PROZAC) 40 MG capsule Take 1 capsule by mouth daily 30 capsule 3    lansoprazole (PREVACID) 30 MG delayed release capsule TAKE 1 CAPSULE BY MOUTH DAILY 30 capsule 3    mirtazapine (REMERON) 30 MG tablet TAKE 1 TABLET BY MOUTH AT BEDTIME 30 tablet 5    PROAIR  (90 Base) MCG/ACT inhaler INHALE 2 PUFFS BY MOUTH INTO THE LUNGS EVERY 4 HOURS AS NEEDED FOR WHEEZING 8.5 g 2    estradiol-norethindrone (ACTIVELLA) 1-0.5 MG per tablet TAKE 1 TABLET BY MOUTH DAILY 28 tablet 0    fluticasone (FLONASE) 50 MCG/ACT nasal spray 1 spray by Each Nostril route daily 2 Bottle 1    meloxicam (MOBIC) 15 MG tablet Take 1 tablet by mouth daily 30 tablet 3    tiZANidine (ZANAFLEX) 4 MG tablet Take 1 tablet by mouth 3 times daily 30 tablet 0    montelukast (SINGULAIR) 10 MG tablet Take 1 tablet by mouth nightly 30 tablet 3    levocetirizine (XYZAL) 5 MG tablet Take 1 tablet by mouth nightly 30 tablet 2    cloNIDine (CATAPRES) 0.1 MG tablet Take 0.1 mg by mouth 2 times daily       magnesium 30 MG tablet Take 30 mg by mouth 2 times daily        (All medications reviewed and updated by provider sincelast office visit or hospitalization)   Patient has no known allergies.   Social History     Tobacco Use   Smoking Status Never Smoker   Smokeless Tobacco Never Used      (If patient a smoker, smoking cessation counseling offered)     Social History     Substance and Sexual Activity   Alcohol Use No       REVIEW OF SYSTEMS:  Review of Systems      Physical Exam:      Vitals:    12/30/21 1127   BP: 118/62   Pulse: 105   Temp: 96 °F (35.6 °C)     Body mass index is 32.99 kg/m². Patient is a 59 y.o. female in noacute distress and alert and oriented to person, place and time. Physical Exam  Constitutional: Patient in no acute distress. Neuro: Alert andoriented to person, place and time. Psych: Mood normal, affect normal  Skin: No rash noted  HEENT: Head: Normocephalic and atraumatic  Conjunctivae and EOM are normal. Pupils are equal, round  Nose: Normal  Right External Ear: Normal; Left External Ear: Normal  Mouth: Mucosa Moist  Neck: Supple  Lungs: Respiratory effort is normal  Cardiovascular: Warm & Pink  Abdomen: Soft, non-tender, non-distended with no CVA,  No flank tenderness,  Or hepatosplenomegaly   Lymphatics: No palpable lymphadenopathy. Bladder non-tender and not distended. Musculoskeletal: Normalgait and station      and Plan      1. Recurrent UTI    2. Benign essential microscopic hematuria           Plan:      No follow-ups on file. Prescriptions Ordered:  No orders of the defined types were placed in this encounter. Orders Placed:  No orders of the defined types were placed in this encounter. Afton Holstein, MD    Agree with the ROS entered by the MA.

## 2022-01-03 RX ORDER — MIRTAZAPINE 30 MG/1
TABLET, FILM COATED ORAL
Qty: 30 TABLET | Refills: 5 | Status: SHIPPED | OUTPATIENT
Start: 2022-01-03 | End: 2022-06-29

## 2022-02-28 RX ORDER — MELOXICAM 7.5 MG/1
TABLET ORAL
Qty: 30 TABLET | Refills: 0 | OUTPATIENT
Start: 2022-02-28

## 2022-05-06 NOTE — TELEPHONE ENCOUNTER
Last visit: 11/11/21  Last Med refill: 8/9/21      Next Visit Date:  No future appointments.     Health Maintenance   Topic Date Due    HIV screen  Never done    DTaP/Tdap/Td vaccine (1 - Tdap) Never done    Colorectal Cancer Screen  Never done    Shingles vaccine (1 of 2) Never done    COVID-19 Vaccine (3 - Booster for Moderna series) 11/12/2021    A1C test (Diabetic or Prediabetic)  08/09/2022    Depression Screen  10/27/2022    Potassium  12/07/2022    Creatinine  12/07/2022    Breast cancer screen  09/15/2023    Cervical cancer screen  10/21/2024    Lipids  08/09/2026    Flu vaccine  Completed    Hepatitis C screen  Completed    Hepatitis A vaccine  Aged Out    Hepatitis B vaccine  Aged Out    Hib vaccine  Aged Out    Meningococcal (ACWY) vaccine  Aged Out    Pneumococcal 0-64 years Vaccine  Aged Out       Hemoglobin A1C (%)   Date Value   08/09/2021 6.2 (H)             ( goal A1C is < 7)   No results found for: LABMICR  LDL Cholesterol (mg/dL)   Date Value   08/09/2021 110       (goal LDL is <100)   AST (U/L)   Date Value   08/09/2021 24     ALT (U/L)   Date Value   08/09/2021 24     BUN (mg/dL)   Date Value   08/09/2021 14     BP Readings from Last 3 Encounters:   12/30/21 118/62   11/16/21 106/71   11/11/21 130/80          (goal 120/80)    All Future Testing planned in [facility]  Lab Frequency Next Occurrence   Urinalysis Reflex to Culture Once 10/21/2021   CBC Auto Differential Once 11/11/2021   Comprehensive Metabolic Panel Once 55/25/5535   Hemoglobin A1C Once 02/11/2022   Lipid Panel Once 11/11/2021   TSH with Reflex Once 02/11/2022   XR CHEST STANDARD (2 VW) Once 11/11/2021               Patient Active Problem List:     Depressed mood     Scarring of lung

## 2022-05-07 RX ORDER — FLUOXETINE HYDROCHLORIDE 40 MG/1
CAPSULE ORAL
Qty: 30 CAPSULE | Refills: 3 | Status: SHIPPED | OUTPATIENT
Start: 2022-05-07

## 2022-06-29 RX ORDER — MIRTAZAPINE 30 MG/1
TABLET, FILM COATED ORAL
Qty: 30 TABLET | Refills: 5 | Status: SHIPPED | OUTPATIENT
Start: 2022-06-29

## 2022-10-24 ENCOUNTER — OFFICE VISIT (OUTPATIENT)
Dept: FAMILY MEDICINE CLINIC | Age: 62
End: 2022-10-24
Payer: COMMERCIAL

## 2022-10-24 VITALS
HEART RATE: 110 BPM | WEIGHT: 206 LBS | OXYGEN SATURATION: 98 % | TEMPERATURE: 97.3 F | SYSTOLIC BLOOD PRESSURE: 124 MMHG | DIASTOLIC BLOOD PRESSURE: 75 MMHG | BODY MASS INDEX: 31.32 KG/M2

## 2022-10-24 DIAGNOSIS — R73.03 PREDIABETES: ICD-10-CM

## 2022-10-24 DIAGNOSIS — M76.52 JUMPER'S KNEE OF LEFT SIDE: Primary | ICD-10-CM

## 2022-10-24 DIAGNOSIS — Z23 NEED FOR INFLUENZA VACCINATION: ICD-10-CM

## 2022-10-24 LAB — HBA1C MFR BLD: 5.8 %

## 2022-10-24 PROCEDURE — G8427 DOCREV CUR MEDS BY ELIG CLIN: HCPCS | Performed by: FAMILY MEDICINE

## 2022-10-24 PROCEDURE — 90674 CCIIV4 VAC NO PRSV 0.5 ML IM: CPT | Performed by: FAMILY MEDICINE

## 2022-10-24 PROCEDURE — G8417 CALC BMI ABV UP PARAM F/U: HCPCS | Performed by: FAMILY MEDICINE

## 2022-10-24 PROCEDURE — 99213 OFFICE O/P EST LOW 20 MIN: CPT | Performed by: FAMILY MEDICINE

## 2022-10-24 PROCEDURE — G8482 FLU IMMUNIZE ORDER/ADMIN: HCPCS | Performed by: FAMILY MEDICINE

## 2022-10-24 PROCEDURE — 83036 HEMOGLOBIN GLYCOSYLATED A1C: CPT | Performed by: FAMILY MEDICINE

## 2022-10-24 PROCEDURE — 90471 IMMUNIZATION ADMIN: CPT | Performed by: FAMILY MEDICINE

## 2022-10-24 PROCEDURE — 3017F COLORECTAL CA SCREEN DOC REV: CPT | Performed by: FAMILY MEDICINE

## 2022-10-24 PROCEDURE — 1036F TOBACCO NON-USER: CPT | Performed by: FAMILY MEDICINE

## 2022-10-24 RX ORDER — IBUPROFEN 600 MG/1
600 TABLET ORAL 4 TIMES DAILY PRN
Qty: 40 TABLET | Refills: 0 | Status: SHIPPED | OUTPATIENT
Start: 2022-10-24

## 2022-10-24 SDOH — ECONOMIC STABILITY: FOOD INSECURITY: WITHIN THE PAST 12 MONTHS, YOU WORRIED THAT YOUR FOOD WOULD RUN OUT BEFORE YOU GOT MONEY TO BUY MORE.: NEVER TRUE

## 2022-10-24 SDOH — ECONOMIC STABILITY: FOOD INSECURITY: WITHIN THE PAST 12 MONTHS, THE FOOD YOU BOUGHT JUST DIDN'T LAST AND YOU DIDN'T HAVE MONEY TO GET MORE.: NEVER TRUE

## 2022-10-24 ASSESSMENT — PATIENT HEALTH QUESTIONNAIRE - PHQ9
SUM OF ALL RESPONSES TO PHQ QUESTIONS 1-9: 0
SUM OF ALL RESPONSES TO PHQ9 QUESTIONS 1 & 2: 0
SUM OF ALL RESPONSES TO PHQ QUESTIONS 1-9: 0
1. LITTLE INTEREST OR PLEASURE IN DOING THINGS: 0
2. FEELING DOWN, DEPRESSED OR HOPELESS: 0

## 2022-10-24 ASSESSMENT — SOCIAL DETERMINANTS OF HEALTH (SDOH): HOW HARD IS IT FOR YOU TO PAY FOR THE VERY BASICS LIKE FOOD, HOUSING, MEDICAL CARE, AND HEATING?: NOT HARD AT ALL

## 2022-10-24 NOTE — PROGRESS NOTES
General FM note    Renetta Ferguson is a 57 y.o. female who presents today for follow up on her  medical conditions as noted below. Renetta Ferguson is c/o of   Chief Complaint   Patient presents with    Swelling     Left knee       Patient Active Problem List:     Depressed mood     Scarring of lung     Past Medical History:   Diagnosis Date    Back pain     Bowel obstruction ([address]     Depression       Past Surgical History:   Procedure Laterality Date    HAND SURGERY Left 01/2017    OTHER SURGICAL HISTORY      left ovary and tube removal   age 20    OTHER SURGICAL HISTORY      novasure ablation    PARATHYROIDECTOMY      SPINE SURGERY      THYROIDECTOMY, PARTIAL      WRIST FRACTURE SURGERY       No family history on file.   Current Outpatient Medications   Medication Sig Dispense Refill    ibuprofen (ADVIL;MOTRIN) 600 MG tablet Take 1 tablet by mouth 4 times daily as needed for Pain 40 tablet 0    diclofenac sodium (VOLTAREN) 1 % GEL Apply 4 g topically 4 times daily 150 g 1    mirtazapine (REMERON) 30 MG tablet TAKE 1 TABLET BY MOUTH AT BEDTIME 30 tablet 5    FLUoxetine (PROZAC) 40 MG capsule TAKE 1 CAPSULE BY MOUTH 40 MG DAILY 30 capsule 3    aspirin 81 MG EC tablet Take 81 mg by mouth daily      cyanocobalamin 1000 MCG tablet Take 1,000 mcg by mouth daily      gabapentin (NEURONTIN) 600 MG tablet       furosemide (LASIX) 20 MG tablet Take 1 tablet by mouth daily 20 tablet 0    meloxicam (MOBIC) 7.5 MG tablet Take 1 tablet by mouth daily 30 tablet 0    oxybutynin (DITROPAN XL) 10 MG extended release tablet Take 1 tablet by mouth daily 30 tablet 3    ibuprofen (ADVIL;MOTRIN) 800 MG tablet TAKE 1 TABLET BY MOUTH TWICE DAILY AS NEEDED FOR PAIN 60 tablet 0    lansoprazole (PREVACID) 30 MG delayed release capsule TAKE 1 CAPSULE BY MOUTH DAILY 30 capsule 3    PROAIR  (90 Base) MCG/ACT inhaler INHALE 2 PUFFS BY MOUTH INTO THE LUNGS EVERY 4 HOURS AS NEEDED FOR WHEEZING 8.5 g 2    estradiol-norethindrone (ACTIVELLA) 1-0.5 MG per tablet TAKE 1 TABLET BY MOUTH DAILY 28 tablet 0    fluticasone (FLONASE) 50 MCG/ACT nasal spray 1 spray by Each Nostril route daily 2 Bottle 1    meloxicam (MOBIC) 15 MG tablet Take 1 tablet by mouth daily 30 tablet 3    tiZANidine (ZANAFLEX) 4 MG tablet Take 1 tablet by mouth 3 times daily 30 tablet 0    montelukast (SINGULAIR) 10 MG tablet Take 1 tablet by mouth nightly 30 tablet 3    levocetirizine (XYZAL) 5 MG tablet Take 1 tablet by mouth nightly 30 tablet 2    cloNIDine (CATAPRES) 0.1 MG tablet Take 0.1 mg by mouth 2 times daily       magnesium 30 MG tablet Take 30 mg by mouth 2 times daily       No current facility-administered medications for this visit. ALLERGIES:  No Known Allergies    Social History     Tobacco Use    Smoking status: Never    Smokeless tobacco: Never   Substance Use Topics    Alcohol use: No      Body mass index is 31.32 kg/m². /75   Pulse (!) 110   Temp 97.3 °F (36.3 °C)   Wt 206 lb (93.4 kg)   SpO2 98%   BMI 31.32 kg/m²     Subjective:      HPI    57 y.o. female coming in today with left knee swelling. Has FU with rheumat bc of RA. Patient states that she has been having pain at her left lower knee area. Has been swelling. And again this pain sometimes with getting up. She has been using ice. Did use ibuprofen but ran out. Did use topical over-the-counter. Review of Systems   Constitutional: Negative for fever and unexpected weight change. Pertinent items are noted in HPI. Objective:   Physical Exam  Constitutional: VS (see above). General appearance: normal development, habitus and attention, no deformities. No distress. Eyes: normal conjunctiva and lids. Skin: no rashes, lesions or ulcers. Musculoskeletal: normal gait. Nails: no clubbing or cyanosis. Left knee: Discomfort at the patella tendon. Psychiatric: alert and oriented to place, time and person. Normal mood and affect. Assessment:       Diagnosis Orders   1.

## 2022-12-16 ENCOUNTER — TELEPHONE (OUTPATIENT)
Dept: FAMILY MEDICINE CLINIC | Age: 62
End: 2022-12-16

## 2022-12-16 NOTE — TELEPHONE ENCOUNTER
Patient called in stating she is sick she stated that she is having the following sx she tested for covid yesterday  and it was negative     She stated she has been sick since Monday     Deep chest congestion  Fever  Chill   Body aches  Bad headache   Sore throat  Cough      Please advise

## 2022-12-19 RX ORDER — AMOXICILLIN 500 MG/1
500 CAPSULE ORAL 2 TIMES DAILY
Qty: 14 CAPSULE | Refills: 0 | Status: SHIPPED | OUTPATIENT
Start: 2022-12-19 | End: 2022-12-26

## 2022-12-19 NOTE — TELEPHONE ENCOUNTER
It still sounds like a viral infection. I did call in antibiotic for her to use if not better today. Thank you.

## 2023-01-04 RX ORDER — MIRTAZAPINE 30 MG/1
TABLET, FILM COATED ORAL
Qty: 30 TABLET | Refills: 5 | Status: SHIPPED | OUTPATIENT
Start: 2023-01-04

## 2023-02-24 ENCOUNTER — OFFICE VISIT (OUTPATIENT)
Dept: FAMILY MEDICINE CLINIC | Age: 63
End: 2023-02-24
Payer: COMMERCIAL

## 2023-02-24 VITALS
WEIGHT: 206 LBS | HEART RATE: 94 BPM | SYSTOLIC BLOOD PRESSURE: 159 MMHG | BODY MASS INDEX: 31.22 KG/M2 | DIASTOLIC BLOOD PRESSURE: 88 MMHG | HEIGHT: 68 IN

## 2023-02-24 DIAGNOSIS — L98.9 SKIN LESION: ICD-10-CM

## 2023-02-24 DIAGNOSIS — Z76.0 MEDICATION REFILL: Primary | ICD-10-CM

## 2023-02-24 PROCEDURE — G8417 CALC BMI ABV UP PARAM F/U: HCPCS | Performed by: NURSE PRACTITIONER

## 2023-02-24 PROCEDURE — G8482 FLU IMMUNIZE ORDER/ADMIN: HCPCS | Performed by: NURSE PRACTITIONER

## 2023-02-24 PROCEDURE — 1036F TOBACCO NON-USER: CPT | Performed by: NURSE PRACTITIONER

## 2023-02-24 PROCEDURE — 99213 OFFICE O/P EST LOW 20 MIN: CPT | Performed by: NURSE PRACTITIONER

## 2023-02-24 PROCEDURE — 3017F COLORECTAL CA SCREEN DOC REV: CPT | Performed by: NURSE PRACTITIONER

## 2023-02-24 PROCEDURE — G8427 DOCREV CUR MEDS BY ELIG CLIN: HCPCS | Performed by: NURSE PRACTITIONER

## 2023-02-24 RX ORDER — LANSOPRAZOLE 30 MG/1
30 CAPSULE, DELAYED RELEASE ORAL DAILY
Qty: 30 CAPSULE | Refills: 3 | Status: SHIPPED | OUTPATIENT
Start: 2023-02-24

## 2023-02-24 RX ORDER — ESTRADIOL AND NORETHINDRONE ACETATE 1; .5 MG/1; MG/1
1 TABLET ORAL DAILY
Qty: 28 TABLET | Refills: 3 | Status: SHIPPED | OUTPATIENT
Start: 2023-02-24

## 2023-02-24 RX ORDER — FLUOXETINE HYDROCHLORIDE 40 MG/1
CAPSULE ORAL
Qty: 30 CAPSULE | Refills: 3 | Status: SHIPPED | OUTPATIENT
Start: 2023-02-24

## 2023-02-24 SDOH — ECONOMIC STABILITY: HOUSING INSECURITY
IN THE LAST 12 MONTHS, WAS THERE A TIME WHEN YOU DID NOT HAVE A STEADY PLACE TO SLEEP OR SLEPT IN A SHELTER (INCLUDING NOW)?: NO

## 2023-02-24 SDOH — ECONOMIC STABILITY: FOOD INSECURITY: WITHIN THE PAST 12 MONTHS, THE FOOD YOU BOUGHT JUST DIDN'T LAST AND YOU DIDN'T HAVE MONEY TO GET MORE.: NEVER TRUE

## 2023-02-24 SDOH — ECONOMIC STABILITY: INCOME INSECURITY: HOW HARD IS IT FOR YOU TO PAY FOR THE VERY BASICS LIKE FOOD, HOUSING, MEDICAL CARE, AND HEATING?: NOT HARD AT ALL

## 2023-02-24 SDOH — ECONOMIC STABILITY: FOOD INSECURITY: WITHIN THE PAST 12 MONTHS, YOU WORRIED THAT YOUR FOOD WOULD RUN OUT BEFORE YOU GOT MONEY TO BUY MORE.: NEVER TRUE

## 2023-02-24 ASSESSMENT — ENCOUNTER SYMPTOMS
CONSTIPATION: 0
ROS SKIN COMMENTS: SKIN LESION
DIARRHEA: 0
SINUS PRESSURE: 0
SHORTNESS OF BREATH: 0
CHEST TIGHTNESS: 0
ABDOMINAL PAIN: 0
COLOR CHANGE: 0

## 2023-02-24 ASSESSMENT — PATIENT HEALTH QUESTIONNAIRE - PHQ9
SUM OF ALL RESPONSES TO PHQ QUESTIONS 1-9: 2
2. FEELING DOWN, DEPRESSED OR HOPELESS: 1
1. LITTLE INTEREST OR PLEASURE IN DOING THINGS: 1
SUM OF ALL RESPONSES TO PHQ QUESTIONS 1-9: 2
SUM OF ALL RESPONSES TO PHQ9 QUESTIONS 1 & 2: 2
SUM OF ALL RESPONSES TO PHQ QUESTIONS 1-9: 2
SUM OF ALL RESPONSES TO PHQ QUESTIONS 1-9: 2

## 2023-02-24 NOTE — PROGRESS NOTES
Anne Almonte is a 57 y.o. female who presents in office today with Self follow up on recent condition of     Chief Complaint   Patient presents with    Mole     Upper right side of back     Sweats     Night sweats, out of Activella     Medication Refill     Needs refills on Prozac and Prevacid    Health Maintenance     Patient has not received Covid booster, but will think about getting it in the near future. History of Present Illness:     Sweats  Associated symptoms include diaphoresis (at night). Pertinent negatives include no abdominal pain, arthralgias, chest pain, headaches, myalgias, numbness or rash. Medication Refill  Associated symptoms include diaphoresis (at night). Pertinent negatives include no abdominal pain, arthralgias, chest pain, headaches, myalgias, numbness or rash. Here with concern for spot on right upper back. Felt spot a few months ago. Unsure if it has changed in size or appearance. Not sure if it has drained anything. Her  has been taking pictures of it. No family history of skin cancer. Also requesting refills. She had stopped taking Activella and has had recurrence of night sweats. Care gaps: COVID-19 vaccine: Wadie Pyle x2,   Depression Screenin    Health Maintenance Due   Topic Date Due    HIV screen  Never done    DTaP/Tdap/Td vaccine (1 - Tdap) Never done    Shingles vaccine (1 of 2) Never done    COVID-19 Vaccine (3 - Booster for Wadie Pyle series) 2021        Patient Care Team:  Tita Gagnon MD as PCP - General (Family Medicine)  Tita Gagnon MD as PCP - Empaneled Provider  Vadim Dunn MD as Consulting Physician (Pulmonology)    Reviewed     [x] Past Medical, Family, and Social History was reviewed per writer and does contribute to the patient presenting condition.     [x] Laboratory Results, Vital signs, Imaging, Active Problems, Immunizations, Current/Recently Discontinued Medications, Health Maintenance Activities Due, Referral Notes (if available) were reviewed per writer     [x] Reviewed Depression screening if taken or valid today or any other valid screening tool (others seen below) Interpretation of Total Score DepressionSeverity: 1-4 = Minimal depression, 5-9 = Mild depression, 10-14 = Moderate depression, 15-19 = Moderately severe depression, 20-27 =Severe depression    PHQ Scores 2/24/2023 10/24/2022 10/27/2021 8/9/2021 12/4/2020 8/18/2020 3/3/2020   PHQ2 Score 2 0 0 2 1 - 3   PHQ9 Score 2 0 0 2 1 1 15     Interpretation of Total Score Depression Severity: 1-4 = Minimal depression, 5-9 = Mild depression, 10-14 = Moderate depression, 15-19 = Moderately severe depression, 20-27 = Severe depression     Review of Systems (Subjective)     Review of Systems   Constitutional:  Positive for diaphoresis (at night). Negative for activity change, appetite change and unexpected weight change. HENT:  Negative for sinus pressure. Respiratory:  Negative for chest tightness and shortness of breath. Cardiovascular:  Negative for chest pain and palpitations. Gastrointestinal:  Negative for abdominal pain, constipation and diarrhea. Genitourinary:  Negative for difficulty urinating and dysuria. Musculoskeletal:  Negative for arthralgias and myalgias. Skin:  Negative for color change and rash. Skin lesion   Neurological:  Negative for dizziness, numbness and headaches. Psychiatric/Behavioral:  Negative for dysphoric mood and sleep disturbance. The patient is not nervous/anxious. See HPI     Physical Assessment (Objective)     BP (!) 159/88 (Site: Left Lower Arm, Position: Sitting, Cuff Size: Medium Adult)   Pulse 94   Ht 5' 8\" (1.727 m)   Wt 206 lb (93.4 kg)   BMI 31.32 kg/m²      Physical Exam  Vitals reviewed. Constitutional:       Appearance: She is obese. HENT:      Head: Normocephalic and atraumatic.       Right Ear: External ear normal.      Left Ear: External ear normal.      Nose: Nose normal.      Mouth/Throat:      Mouth: Mucous membranes are moist.      Pharynx: Oropharynx is clear. Eyes:      Extraocular Movements: Extraocular movements intact. Conjunctiva/sclera: Conjunctivae normal.   Cardiovascular:      Rate and Rhythm: Normal rate and regular rhythm. Pulses: Normal pulses. Heart sounds: Normal heart sounds. No murmur heard. Pulmonary:      Effort: Pulmonary effort is normal. No respiratory distress. Breath sounds: Normal breath sounds. Abdominal:      General: Bowel sounds are normal.      Palpations: Abdomen is soft. Musculoskeletal:         General: No swelling. Normal range of motion. Cervical back: Normal range of motion and neck supple. Skin:     General: Skin is warm and dry. Capillary Refill: Capillary refill takes less than 2 seconds. Findings: Lesion (right lateral torso) present. Neurological:      Mental Status: She is alert and oriented to person, place, and time. Motor: No weakness. Gait: Gait normal.   Psychiatric:         Mood and Affect: Mood normal.         Behavior: Behavior normal.         Thought Content: Thought content normal.         Judgment: Judgment normal.       Diagnoses / Plan:   1. Medication refill  -     estradiol-norethindrone (ACTIVELLA) 1-0.5 MG per tablet; Take 1 tablet by mouth daily, Disp-28 tablet, R-3Normal  -     lansoprazole (PREVACID) 30 MG delayed release capsule; Take 1 capsule by mouth daily, Disp-30 capsule, R-3Normal  -     FLUoxetine (PROZAC) 40 MG capsule; TAKE 1 CAPSULE BY MOUTH 40 MG DAILY, Disp-30 capsule, R-3Normal  2. Skin lesion  -     AFL - Collin Pepper MD, Dermatology, [facility] and agreement was voiced with all above plans. All questions answered to satisfaction. Encouraged healthy diet and exercise. Call office with any questions or new or worsening symptoms or concerns. Return if symptoms worsen or fail to improve.             Electronically signed by Claudene Lipoma, APRN - CNP on 2/24/2023 at 1:24 PM    Note is dictated utilizing voice recognition software. Unfortunately this leads to occasional typographical errors. Please contact our office if you have any questions.

## 2023-03-23 ENCOUNTER — OFFICE VISIT (OUTPATIENT)
Dept: FAMILY MEDICINE CLINIC | Age: 63
End: 2023-03-23

## 2023-03-23 VITALS
DIASTOLIC BLOOD PRESSURE: 75 MMHG | SYSTOLIC BLOOD PRESSURE: 135 MMHG | HEART RATE: 98 BPM | OXYGEN SATURATION: 98 % | TEMPERATURE: 97 F

## 2023-03-23 DIAGNOSIS — R05.3 COUGH, PERSISTENT: ICD-10-CM

## 2023-03-23 DIAGNOSIS — Z98.890 STATUS POST OPEN REDUCTION WITH INTERNAL FIXATION (ORIF) OF FRACTURE OF ANKLE: Primary | ICD-10-CM

## 2023-03-23 DIAGNOSIS — Z87.81 STATUS POST OPEN REDUCTION WITH INTERNAL FIXATION (ORIF) OF FRACTURE OF ANKLE: Primary | ICD-10-CM

## 2023-03-23 RX ORDER — TIOTROPIUM BROMIDE 18 UG/1
18 CAPSULE ORAL; RESPIRATORY (INHALATION) DAILY
Qty: 30 CAPSULE | Refills: 3 | Status: SHIPPED | OUTPATIENT
Start: 2023-03-23

## 2023-03-23 RX ORDER — DULOXETIN HYDROCHLORIDE 20 MG/1
20 CAPSULE, DELAYED RELEASE ORAL DAILY
Qty: 30 CAPSULE | Refills: 3 | Status: SHIPPED | OUTPATIENT
Start: 2023-03-23

## 2023-03-23 ASSESSMENT — PATIENT HEALTH QUESTIONNAIRE - PHQ9
SUM OF ALL RESPONSES TO PHQ QUESTIONS 1-9: 0
SUM OF ALL RESPONSES TO PHQ9 QUESTIONS 1 & 2: 0
SUM OF ALL RESPONSES TO PHQ QUESTIONS 1-9: 0
1. LITTLE INTEREST OR PLEASURE IN DOING THINGS: 0
SUM OF ALL RESPONSES TO PHQ QUESTIONS 1-9: 0
SUM OF ALL RESPONSES TO PHQ QUESTIONS 1-9: 0
2. FEELING DOWN, DEPRESSED OR HOPELESS: 0
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
## 2023-04-19 ENCOUNTER — TELEPHONE (OUTPATIENT)
Dept: FAMILY MEDICINE CLINIC | Age: 63
End: 2023-04-19

## 2023-04-19 NOTE — TELEPHONE ENCOUNTER
Patient is having issues of sore throat, swallowing, chills , stomach pain and diarrhea x3 days . Patient declined to be evaluated at Urgent Care, would like to know if antibiotic can be sent to pharmacy.  Patient lov 2/24/23

## 2023-04-19 NOTE — TELEPHONE ENCOUNTER
Sounds like a viral infection. It like gastroenteritis. Please sit tight another couple of days. Push fluids call even on the weekend if this does not get better. Thank you.

## 2023-07-05 RX ORDER — MIRTAZAPINE 30 MG/1
TABLET, FILM COATED ORAL
Qty: 30 TABLET | Refills: 5 | Status: SHIPPED | OUTPATIENT
Start: 2023-07-05

## 2023-08-16 DIAGNOSIS — Z76.0 MEDICATION REFILL: ICD-10-CM

## 2023-08-17 RX ORDER — ESTRADIOL AND NORETHINDRONE ACETATE 1; .5 MG/1; MG/1
TABLET ORAL
Qty: 112 TABLET | Refills: 0 | Status: SHIPPED | OUTPATIENT
Start: 2023-08-17

## 2023-12-29 DIAGNOSIS — G89.29 CHRONIC PAIN OF LEFT KNEE: ICD-10-CM

## 2023-12-29 DIAGNOSIS — M25.562 CHRONIC PAIN OF LEFT KNEE: ICD-10-CM

## 2024-01-22 DIAGNOSIS — Z76.0 MEDICATION REFILL: ICD-10-CM

## 2024-01-22 RX ORDER — FLUOXETINE HYDROCHLORIDE 40 MG/1
CAPSULE ORAL
Qty: 30 CAPSULE | Refills: 3 | Status: SHIPPED | OUTPATIENT
Start: 2024-01-22

## 2024-01-29 RX ORDER — MIRTAZAPINE 30 MG/1
TABLET, FILM COATED ORAL
Qty: 30 TABLET | Refills: 5 | Status: SHIPPED | OUTPATIENT
Start: 2024-01-29

## 2024-03-06 DIAGNOSIS — Z76.0 MEDICATION REFILL: ICD-10-CM

## 2024-03-06 RX ORDER — LANSOPRAZOLE 30 MG/1
30 CAPSULE, DELAYED RELEASE ORAL DAILY
Qty: 30 CAPSULE | Refills: 3 | Status: SHIPPED | OUTPATIENT
Start: 2024-03-06

## 2024-03-06 NOTE — TELEPHONE ENCOUNTER
Nadiya Arreola is calling to request a refill on the following medication(s):    Last Visit Date (If Applicable):  2/24/2023    Next Visit Date:    Visit date not found    Medication Request:  Requested Prescriptions     Pending Prescriptions Disp Refills    lansoprazole (PREVACID) 30 MG delayed release capsule [Pharmacy Med Name: Lansoprazole 30 MG Oral Capsule Delayed Release (Prevacid)] 30 capsule 3     Sig: TAKE 1 CAPSULE BY MOUTH DAILY

## 2024-03-19 ENCOUNTER — OFFICE VISIT (OUTPATIENT)
Dept: FAMILY MEDICINE CLINIC | Age: 64
End: 2024-03-19
Payer: COMMERCIAL

## 2024-03-19 VITALS
BODY MASS INDEX: 31.33 KG/M2 | SYSTOLIC BLOOD PRESSURE: 132 MMHG | OXYGEN SATURATION: 98 % | DIASTOLIC BLOOD PRESSURE: 75 MMHG | TEMPERATURE: 97.2 F | HEART RATE: 123 BPM | HEIGHT: 68 IN

## 2024-03-19 DIAGNOSIS — K21.9 GASTROESOPHAGEAL REFLUX DISEASE, UNSPECIFIED WHETHER ESOPHAGITIS PRESENT: ICD-10-CM

## 2024-03-19 DIAGNOSIS — M25.562 CHRONIC PAIN OF LEFT KNEE: Primary | ICD-10-CM

## 2024-03-19 DIAGNOSIS — G89.29 CHRONIC PAIN OF LEFT KNEE: Primary | ICD-10-CM

## 2024-03-19 DIAGNOSIS — Z76.0 MEDICATION REFILL: ICD-10-CM

## 2024-03-19 PROCEDURE — G8427 DOCREV CUR MEDS BY ELIG CLIN: HCPCS | Performed by: FAMILY MEDICINE

## 2024-03-19 PROCEDURE — 3017F COLORECTAL CA SCREEN DOC REV: CPT | Performed by: FAMILY MEDICINE

## 2024-03-19 PROCEDURE — G8400 PT W/DXA NO RESULTS DOC: HCPCS | Performed by: FAMILY MEDICINE

## 2024-03-19 PROCEDURE — 4004F PT TOBACCO SCREEN RCVD TLK: CPT | Performed by: FAMILY MEDICINE

## 2024-03-19 PROCEDURE — G8484 FLU IMMUNIZE NO ADMIN: HCPCS | Performed by: FAMILY MEDICINE

## 2024-03-19 PROCEDURE — 1090F PRES/ABSN URINE INCON ASSESS: CPT | Performed by: FAMILY MEDICINE

## 2024-03-19 PROCEDURE — 99213 OFFICE O/P EST LOW 20 MIN: CPT | Performed by: FAMILY MEDICINE

## 2024-03-19 PROCEDURE — 1123F ACP DISCUSS/DSCN MKR DOCD: CPT | Performed by: FAMILY MEDICINE

## 2024-03-19 PROCEDURE — G8417 CALC BMI ABV UP PARAM F/U: HCPCS | Performed by: FAMILY MEDICINE

## 2024-03-19 RX ORDER — FLUOXETINE HYDROCHLORIDE 40 MG/1
CAPSULE ORAL
Qty: 30 CAPSULE | Refills: 3 | Status: SHIPPED | OUTPATIENT
Start: 2024-03-19

## 2024-03-19 RX ORDER — LANSOPRAZOLE 30 MG/1
30 CAPSULE, DELAYED RELEASE ORAL DAILY
Qty: 30 CAPSULE | Refills: 3 | Status: SHIPPED | OUTPATIENT
Start: 2024-03-19

## 2024-03-19 RX ORDER — IBUPROFEN 800 MG/1
TABLET ORAL
Qty: 60 TABLET | Refills: 0 | Status: SHIPPED | OUTPATIENT
Start: 2024-03-19

## 2024-03-19 RX ORDER — ESTRADIOL AND NORETHINDRONE ACETATE 1; .5 MG/1; MG/1
1 TABLET ORAL DAILY
Qty: 112 TABLET | Refills: 0 | Status: SHIPPED | OUTPATIENT
Start: 2024-03-19

## 2024-03-19 SDOH — ECONOMIC STABILITY: FOOD INSECURITY: WITHIN THE PAST 12 MONTHS, THE FOOD YOU BOUGHT JUST DIDN'T LAST AND YOU DIDN'T HAVE MONEY TO GET MORE.: NEVER TRUE

## 2024-03-19 SDOH — ECONOMIC STABILITY: FOOD INSECURITY: WITHIN THE PAST 12 MONTHS, YOU WORRIED THAT YOUR FOOD WOULD RUN OUT BEFORE YOU GOT MONEY TO BUY MORE.: NEVER TRUE

## 2024-03-19 SDOH — ECONOMIC STABILITY: INCOME INSECURITY: HOW HARD IS IT FOR YOU TO PAY FOR THE VERY BASICS LIKE FOOD, HOUSING, MEDICAL CARE, AND HEATING?: NOT HARD AT ALL

## 2024-03-19 ASSESSMENT — PATIENT HEALTH QUESTIONNAIRE - PHQ9
SUM OF ALL RESPONSES TO PHQ QUESTIONS 1-9: 0
SUM OF ALL RESPONSES TO PHQ QUESTIONS 1-9: 0
2. FEELING DOWN, DEPRESSED OR HOPELESS: NOT AT ALL
SUM OF ALL RESPONSES TO PHQ QUESTIONS 1-9: 0
1. LITTLE INTEREST OR PLEASURE IN DOING THINGS: NOT AT ALL
SUM OF ALL RESPONSES TO PHQ QUESTIONS 1-9: 0
SUM OF ALL RESPONSES TO PHQ9 QUESTIONS 1 & 2: 0

## 2024-03-19 NOTE — PROGRESS NOTES
General FM note    Nadiya Arreola is a 63 y.o. female who presents today for follow up on her  medical conditions as noted below.  Nadyia Arreola is c/o of   Chief Complaint   Patient presents with    Knee Pain     Left knee       Patient Active Problem List:     Depressed mood     Scarring of lung     Past Medical History:   Diagnosis Date    Back pain     Bowel obstruction (HCC)     Depression       Past Surgical History:   Procedure Laterality Date    HAND SURGERY Left 01/2017    OTHER SURGICAL HISTORY      left ovary and tube removal   age 23    OTHER SURGICAL HISTORY      novasure ablation    PARATHYROIDECTOMY      SPINE SURGERY      THYROIDECTOMY, PARTIAL      WRIST FRACTURE SURGERY       No family history on file.  Current Outpatient Medications   Medication Sig Dispense Refill    ibuprofen (ADVIL;MOTRIN) 800 MG tablet TAKE 1 TABLET BY MOUTH TWICE DAILY AS NEEDED FOR PAIN 60 tablet 0    FLUoxetine (PROZAC) 40 MG capsule TAKE 1 CAPSULE BY MOUTH DAILY 30 capsule 3    lansoprazole (PREVACID) 30 MG delayed release capsule Take 1 capsule by mouth daily 30 capsule 3    estradiol-norethindrone (ACTIVELLA) 1-0.5 MG per tablet Take 1 tablet by mouth daily 112 tablet 0    mirtazapine (REMERON) 30 MG tablet TAKE 1 TABLET BY MOUTH AT BEDTIME 30 tablet 5    DULoxetine (CYMBALTA) 20 MG extended release capsule Take 1 capsule by mouth daily 30 capsule 3    tiotropium (SPIRIVA HANDIHALER) 18 MCG inhalation capsule Inhale 1 capsule into the lungs daily 30 capsule 3    ibuprofen (ADVIL;MOTRIN) 600 MG tablet Take 1 tablet by mouth 4 times daily as needed for Pain 40 tablet 0    diclofenac sodium (VOLTAREN) 1 % GEL Apply 4 g topically 4 times daily 150 g 1    aspirin 81 MG EC tablet Take 1 tablet by mouth daily      cyanocobalamin 1000 MCG tablet Take 1 tablet by mouth daily      furosemide (LASIX) 20 MG tablet Take 1 tablet by mouth daily 20 tablet 0    meloxicam (MOBIC) 7.5 MG tablet Take 1 tablet by mouth daily

## 2024-05-06 ENCOUNTER — HOSPITAL ENCOUNTER (OUTPATIENT)
Dept: MRI IMAGING | Age: 64
Discharge: HOME | End: 2024-05-08
Payer: COMMERCIAL

## 2024-05-06 DIAGNOSIS — G89.29 CHRONIC PAIN OF LEFT KNEE: ICD-10-CM

## 2024-05-06 DIAGNOSIS — M25.562 CHRONIC PAIN OF LEFT KNEE: ICD-10-CM

## 2024-05-06 PROCEDURE — 73721 MRI JNT OF LWR EXTRE W/O DYE: CPT

## 2024-06-25 ENCOUNTER — OFFICE VISIT (OUTPATIENT)
Dept: FAMILY MEDICINE CLINIC | Age: 64
End: 2024-06-25
Payer: COMMERCIAL

## 2024-06-25 VITALS
SYSTOLIC BLOOD PRESSURE: 123 MMHG | OXYGEN SATURATION: 98 % | HEART RATE: 106 BPM | TEMPERATURE: 97.2 F | DIASTOLIC BLOOD PRESSURE: 78 MMHG

## 2024-06-25 DIAGNOSIS — Z76.0 MEDICATION REFILL: ICD-10-CM

## 2024-06-25 DIAGNOSIS — M17.12 PRIMARY OSTEOARTHRITIS OF LEFT KNEE: Primary | ICD-10-CM

## 2024-06-25 PROCEDURE — 1123F ACP DISCUSS/DSCN MKR DOCD: CPT | Performed by: FAMILY MEDICINE

## 2024-06-25 PROCEDURE — 4004F PT TOBACCO SCREEN RCVD TLK: CPT | Performed by: FAMILY MEDICINE

## 2024-06-25 PROCEDURE — 3017F COLORECTAL CA SCREEN DOC REV: CPT | Performed by: FAMILY MEDICINE

## 2024-06-25 PROCEDURE — G8417 CALC BMI ABV UP PARAM F/U: HCPCS | Performed by: FAMILY MEDICINE

## 2024-06-25 PROCEDURE — G8400 PT W/DXA NO RESULTS DOC: HCPCS | Performed by: FAMILY MEDICINE

## 2024-06-25 PROCEDURE — G8427 DOCREV CUR MEDS BY ELIG CLIN: HCPCS | Performed by: FAMILY MEDICINE

## 2024-06-25 PROCEDURE — 99213 OFFICE O/P EST LOW 20 MIN: CPT | Performed by: FAMILY MEDICINE

## 2024-06-25 PROCEDURE — 1090F PRES/ABSN URINE INCON ASSESS: CPT | Performed by: FAMILY MEDICINE

## 2024-06-25 RX ORDER — ESTRADIOL AND NORETHINDRONE ACETATE 1; .5 MG/1; MG/1
1 TABLET ORAL DAILY
Qty: 112 TABLET | Refills: 0 | Status: SHIPPED | OUTPATIENT
Start: 2024-06-25

## 2024-06-25 ASSESSMENT — PATIENT HEALTH QUESTIONNAIRE - PHQ9
SUM OF ALL RESPONSES TO PHQ QUESTIONS 1-9: 0
2. FEELING DOWN, DEPRESSED OR HOPELESS: NOT AT ALL
SUM OF ALL RESPONSES TO PHQ9 QUESTIONS 1 & 2: 0
SUM OF ALL RESPONSES TO PHQ QUESTIONS 1-9: 0
1. LITTLE INTEREST OR PLEASURE IN DOING THINGS: NOT AT ALL

## 2024-06-25 NOTE — PROGRESS NOTES
General FM note    Nadiya Arreola is a 63 y.o. female who presents today for follow up on her  medical conditions as noted below.  Nadiya Arreola is c/o of   Chief Complaint   Patient presents with    Pain     3 month       Patient Active Problem List:     Depressed mood     Scarring of lung     Past Medical History:   Diagnosis Date    Back pain     Bowel obstruction (HCC)     Depression       Past Surgical History:   Procedure Laterality Date    HAND SURGERY Left 01/2017    OTHER SURGICAL HISTORY      left ovary and tube removal   age 23    OTHER SURGICAL HISTORY      novasure ablation    PARATHYROIDECTOMY      SPINE SURGERY      THYROIDECTOMY, PARTIAL      WRIST FRACTURE SURGERY       No family history on file.  Current Outpatient Medications   Medication Sig Dispense Refill    estradiol-norethindrone (ACTIVELLA) 1-0.5 MG per tablet Take 1 tablet by mouth daily 112 tablet 0    ibuprofen (ADVIL;MOTRIN) 800 MG tablet TAKE 1 TABLET BY MOUTH TWICE DAILY AS NEEDED FOR PAIN 60 tablet 0    FLUoxetine (PROZAC) 40 MG capsule TAKE 1 CAPSULE BY MOUTH DAILY 30 capsule 3    lansoprazole (PREVACID) 30 MG delayed release capsule Take 1 capsule by mouth daily 30 capsule 3    mirtazapine (REMERON) 30 MG tablet TAKE 1 TABLET BY MOUTH AT BEDTIME 30 tablet 5    DULoxetine (CYMBALTA) 20 MG extended release capsule Take 1 capsule by mouth daily 30 capsule 3    tiotropium (SPIRIVA HANDIHALER) 18 MCG inhalation capsule Inhale 1 capsule into the lungs daily 30 capsule 3    ibuprofen (ADVIL;MOTRIN) 600 MG tablet Take 1 tablet by mouth 4 times daily as needed for Pain 40 tablet 0    diclofenac sodium (VOLTAREN) 1 % GEL Apply 4 g topically 4 times daily 150 g 1    aspirin 81 MG EC tablet Take 1 tablet by mouth daily      cyanocobalamin 1000 MCG tablet Take 1 tablet by mouth daily      furosemide (LASIX) 20 MG tablet Take 1 tablet by mouth daily 20 tablet 0    meloxicam (MOBIC) 7.5 MG tablet Take 1 tablet by mouth daily 30

## 2024-06-26 ENCOUNTER — TELEPHONE (OUTPATIENT)
Dept: ORTHOPEDIC SURGERY | Age: 64
End: 2024-06-26

## 2024-06-26 NOTE — TELEPHONE ENCOUNTER
Called patient regarding a referral that was sent to our office. Left a message for patient to call and schedule.

## 2024-08-22 DIAGNOSIS — M25.562 PAIN IN BOTH KNEES, UNSPECIFIED CHRONICITY: Primary | ICD-10-CM

## 2024-08-22 DIAGNOSIS — M25.561 PAIN IN BOTH KNEES, UNSPECIFIED CHRONICITY: Primary | ICD-10-CM

## 2024-08-23 ENCOUNTER — OFFICE VISIT (OUTPATIENT)
Dept: ORTHOPEDIC SURGERY | Age: 64
End: 2024-08-23
Payer: COMMERCIAL

## 2024-08-23 VITALS — RESPIRATION RATE: 15 BRPM | BODY MASS INDEX: 31.83 KG/M2 | OXYGEN SATURATION: 97 % | HEIGHT: 68 IN | WEIGHT: 210 LBS

## 2024-08-23 DIAGNOSIS — G89.29 CHRONIC PAIN OF BOTH KNEES: ICD-10-CM

## 2024-08-23 DIAGNOSIS — M25.561 CHRONIC PAIN OF BOTH KNEES: ICD-10-CM

## 2024-08-23 DIAGNOSIS — M25.562 CHRONIC PAIN OF BOTH KNEES: ICD-10-CM

## 2024-08-23 DIAGNOSIS — M17.0 ARTHRITIS OF BOTH KNEES: Primary | ICD-10-CM

## 2024-08-23 PROCEDURE — 1123F ACP DISCUSS/DSCN MKR DOCD: CPT | Performed by: PHYSICIAN ASSISTANT

## 2024-08-23 PROCEDURE — 3017F COLORECTAL CA SCREEN DOC REV: CPT | Performed by: PHYSICIAN ASSISTANT

## 2024-08-23 PROCEDURE — 99203 OFFICE O/P NEW LOW 30 MIN: CPT | Performed by: PHYSICIAN ASSISTANT

## 2024-08-23 PROCEDURE — 1036F TOBACCO NON-USER: CPT | Performed by: PHYSICIAN ASSISTANT

## 2024-08-23 PROCEDURE — G8400 PT W/DXA NO RESULTS DOC: HCPCS | Performed by: PHYSICIAN ASSISTANT

## 2024-08-23 PROCEDURE — 1090F PRES/ABSN URINE INCON ASSESS: CPT | Performed by: PHYSICIAN ASSISTANT

## 2024-08-23 PROCEDURE — G8417 CALC BMI ABV UP PARAM F/U: HCPCS | Performed by: PHYSICIAN ASSISTANT

## 2024-08-23 PROCEDURE — G8427 DOCREV CUR MEDS BY ELIG CLIN: HCPCS | Performed by: PHYSICIAN ASSISTANT

## 2024-08-23 ASSESSMENT — ENCOUNTER SYMPTOMS
COUGH: 0
VOMITING: 0
COLOR CHANGE: 0
SHORTNESS OF BREATH: 0

## 2024-08-23 NOTE — PROGRESS NOTES
[facility] PHYSICIANS [facility] ORTHOPEDICS AND SPORTS MEDICINE  [address]  [place] [id number]  Dept: [phone]    Ambulatory Orthopedic New Patient Visit      CHIEF COMPLAINT:    Chief Complaint   Patient presents with    Knee Pain     Bilateral knee pain- new patient       HISTORY OF PRESENT ILLNESS:      The patient is a 64 y.o. female who is being seen  for consultation and evaluation of bilateral knee pain.    Nadiya Arreola  presents for bilateral knee pain that has been present for  2 years.  The patient notes that she has had 2 falls over the last 6 months. Both times she fell down a few stairs.  The patient sees Rheumatology for Rheumatoid arthritis and was recently prescribed Methotrexate, which is is taking weekly. She has had 2 injections in the left knee with her rheumatologist in the past and notes short term relief of her pain with the injections.  The patient notes that intermittently she will use a cane to help with her balance.  Patient notes that she is worsening swelling and pain towards the evening.  The pain worsens with  prolonged walking, prolonged standing, stair climbing and arising from a seated position.  Patient denies episodes of instability within her knees.  She has tried ibuprofen for her discomfort which she notes does not make much of a difference in her discomfort.      Patient states that she has never had formal physical therapy for her knees but has done home exercises in the past.    Patient is not a diabetic.  She is a current BMI of 31.94 (210 pounds).  Patient denies smoking cigarettes although there was a open pack of cigarettes in her belongings which was visible during the appointment.       Past Medical History:    Past Medical History:   Diagnosis Date    Back pain     Bowel obstruction (HCC)     Depression        Past Surgical History:    Past Surgical History:   Procedure Laterality Date

## 2024-10-07 ENCOUNTER — OFFICE VISIT (OUTPATIENT)
Dept: ORTHOPEDIC SURGERY | Age: 64
End: 2024-10-07
Payer: COMMERCIAL

## 2024-10-07 VITALS — RESPIRATION RATE: 17 BRPM | WEIGHT: 214 LBS | OXYGEN SATURATION: 98 % | BODY MASS INDEX: 32.43 KG/M2 | HEIGHT: 68 IN

## 2024-10-07 DIAGNOSIS — M25.562 CHRONIC PAIN OF BOTH KNEES: ICD-10-CM

## 2024-10-07 DIAGNOSIS — M17.0 ARTHRITIS OF BOTH KNEES: Primary | ICD-10-CM

## 2024-10-07 DIAGNOSIS — G89.29 CHRONIC PAIN OF BOTH KNEES: ICD-10-CM

## 2024-10-07 DIAGNOSIS — M25.561 CHRONIC PAIN OF BOTH KNEES: ICD-10-CM

## 2024-10-07 PROCEDURE — 3017F COLORECTAL CA SCREEN DOC REV: CPT | Performed by: ORTHOPAEDIC SURGERY

## 2024-10-07 PROCEDURE — G8400 PT W/DXA NO RESULTS DOC: HCPCS | Performed by: ORTHOPAEDIC SURGERY

## 2024-10-07 PROCEDURE — G8417 CALC BMI ABV UP PARAM F/U: HCPCS | Performed by: ORTHOPAEDIC SURGERY

## 2024-10-07 PROCEDURE — 1036F TOBACCO NON-USER: CPT | Performed by: ORTHOPAEDIC SURGERY

## 2024-10-07 PROCEDURE — 1090F PRES/ABSN URINE INCON ASSESS: CPT | Performed by: ORTHOPAEDIC SURGERY

## 2024-10-07 PROCEDURE — G8484 FLU IMMUNIZE NO ADMIN: HCPCS | Performed by: ORTHOPAEDIC SURGERY

## 2024-10-07 PROCEDURE — 1123F ACP DISCUSS/DSCN MKR DOCD: CPT | Performed by: ORTHOPAEDIC SURGERY

## 2024-10-07 PROCEDURE — 99214 OFFICE O/P EST MOD 30 MIN: CPT | Performed by: ORTHOPAEDIC SURGERY

## 2024-10-07 PROCEDURE — G8427 DOCREV CUR MEDS BY ELIG CLIN: HCPCS | Performed by: ORTHOPAEDIC SURGERY

## 2024-10-07 ASSESSMENT — ENCOUNTER SYMPTOMS
SHORTNESS OF BREATH: 0
EYE DISCHARGE: 0
ROS SKIN COMMENTS: NEGATIVE FOR RASH
ABDOMINAL PAIN: 0

## 2024-10-07 NOTE — PATIENT INSTRUCTIONS
PATIENTIQ:  PatientIQ helps [facility] stay in touch with you to know how you're feeling, and provides education and care instructions to you at various time points.   Your answers help your care team track your progress to provide the best care possible. PatientIQ will contact you pre-op and post-op via email or text with:  Educational Videos and Care Instructions  Questionnaires About How You're Feeling    Your participation provides you valuable education and helps [facility] continue to provide quality care to all patients. Thank you

## 2024-10-08 NOTE — PROGRESS NOTES
[facility] ORTHOPEDICS AND SPORTS MEDICINE  [address]  [facility] [address]  Dept: [phone]  Dept Fax: [phone]        Ambulatory Follow Up      Subjective:   Nadiya Arreola is a 64 y.o. year old female who presents to our office today for routine followup regarding her   1. Arthritis of both knees    2. Chronic pain of both knees    .    Chief Complaint   Patient presents with    Knee Pain     Bilateral knee pain-Surgical Discussion       HPI  Nadiya Arreola is a 64-year-old female who presents to the office today for recheck.  She was last seen in the office on 8/23/2024 with bilateral knee pain left greater than right.  She has had pain for at least 2 years.  She notes she has had 2 falls over the last 6 months.  She states \"my knees hurt so bad\".  On July 2 she had a Betamethasone left knee injection by her rheumatologist.  She states this helped to some degree.  She notes crepitus to the bilateral knees.  Left knee hurts worse than right.  She also notes she has had trigger finger that she would like to have looked at at some point.  She has had injections twice by her rheumatologist per the patient's recollection.    Review of Systems   Constitutional:  Positive for activity change. Negative for fever.   HENT:  Negative for dental problem.    Eyes:  Negative for discharge.   Respiratory:  Negative for shortness of breath.    Cardiovascular:  Negative for chest pain.   Gastrointestinal:  Negative for abdominal pain.   Genitourinary: Negative.    Musculoskeletal:  Positive for arthralgias.   Skin:         Negative for rash   Neurological:  Positive for weakness.   Psychiatric/Behavioral:  Negative for confusion.        I have reviewed the CC, HPI, ROS, PMH, FHX, Social History, and if not present in this note, I have reviewed in the patient's chart.   I agree with the documentation provided by other staff and

## 2024-10-10 DIAGNOSIS — M79.641 RIGHT HAND PAIN: Primary | ICD-10-CM

## 2024-10-15 ENCOUNTER — OFFICE VISIT (OUTPATIENT)
Dept: ORTHOPEDIC SURGERY | Age: 64
End: 2024-10-15

## 2024-10-15 VITALS — BODY MASS INDEX: 32.43 KG/M2 | RESPIRATION RATE: 15 BRPM | HEIGHT: 68 IN | OXYGEN SATURATION: 98 % | WEIGHT: 214 LBS

## 2024-10-15 DIAGNOSIS — M65.341 TRIGGER RING FINGER OF RIGHT HAND: Primary | ICD-10-CM

## 2024-10-15 RX ORDER — LIDOCAINE HYDROCHLORIDE 10 MG/ML
0.5 INJECTION, SOLUTION INFILTRATION; PERINEURAL ONCE
Status: COMPLETED | OUTPATIENT
Start: 2024-10-15 | End: 2024-10-15

## 2024-10-15 RX ORDER — BETAMETHASONE SODIUM PHOSPHATE AND BETAMETHASONE ACETATE 3; 3 MG/ML; MG/ML
3 INJECTION, SUSPENSION INTRA-ARTICULAR; INTRALESIONAL; INTRAMUSCULAR; SOFT TISSUE ONCE
Status: COMPLETED | OUTPATIENT
Start: 2024-10-15 | End: 2024-10-15

## 2024-10-15 RX ADMIN — BETAMETHASONE SODIUM PHOSPHATE AND BETAMETHASONE ACETATE 3 MG: 3; 3 INJECTION, SUSPENSION INTRA-ARTICULAR; INTRALESIONAL; INTRAMUSCULAR; SOFT TISSUE at 15:06

## 2024-10-15 RX ADMIN — LIDOCAINE HYDROCHLORIDE 0.5 ML: 10 INJECTION, SOLUTION INFILTRATION; PERINEURAL at 15:07

## 2024-10-15 NOTE — PATIENT INSTRUCTIONS
CORTISONE INJECTION CARE    The injection site should never get red, hot, or swollen and if it does the patient will contact our office right away. The patient may experience a increase in soreness the first 24-48 hours due to a cortisone flair and can take anti-inflammatories for a short period of time to reduce that soreness. The patient should not submerge the injection site in water for a minimum of 24 hours to avoid infection. This means no lakes, pools, ponds, or hot tubs for 24 hours. If the patient is diabetic the injection may increase their blood sugar for up to one week. The patient can do this cortisone injection once every 4 months as needed.                                                                                                                                                                                                                                                                                                                PATIENTIQ:  PatientIQ helps [facility] stay in touch with you to know how you're feeling, and provides education and care instructions to you at various time points.   Your answers help your care team track your progress to provide the best care possible. PatientIQ will contact you pre-op and post-op via email or text with:  Educational Videos and Care Instructions  Questionnaires About How You're Feeling    Your participation provides you valuable education and helps [facility] continue to provide quality care to all patients. Thank you

## 2024-10-15 NOTE — PROGRESS NOTES
[facility] ORTHOPEDICS AND SPORTS MEDICINE  [address]  [facility] [address]  Dept: [phone]  Dept Fax: [phone]        Ambulatory Follow Up      Subjective:   Nadiya Arreola is a 64 y.o. year old female who presents to our office today for routine followup regarding her   1. Trigger ring finger of right hand    .    Chief Complaint   Patient presents with    Hand Pain     Right hand pain- 4th digit       Date of Injury: no known injuries.     HPI Nadiya Arreola  is a 64 y.o. Right hand dominant  female who presents today in follow for her trigger finger of the right ring finger.  The patient was last seen on 10/7/2024 by Dr. May for her bilateral knee arthritis and underwent treatment in the form of a script for physical therapy.  She has not started PT yet.    She states that she has had catching and locking of the right ring finger for approximately 1 year now.  She denies any known injuries.  She does state that she also has frequent swelling of both hands. She additionally reports occasional numbness and paresthesias throughout both hands. She does currently see rheumatology for rheumatoid arthritis and she is on Methotrexate for this. She oswald hair for work.    Review of Systems   Musculoskeletal:  Positive for arthralgias and joint swelling.   Skin:  Negative for color change and rash.   Neurological:  Positive for numbness.         Objective :   There were no vitals taken for this visit. There is no height or weight on file to calculate BMI.  General: Nadiya Arreola is a 64 y.o. female who is alert and oriented and sitting comfortably in our office.  Ortho Exam  MS: Examination of the right hand demonstrates that there is lateral subluxation of the right thumb and moderate soft tissue swelling along the thumb CMC joint.  There is mild to moderate soft tissue swelling through out the MCP joints.

## 2024-10-16 ASSESSMENT — ENCOUNTER SYMPTOMS: COLOR CHANGE: 0

## 2025-03-04 DIAGNOSIS — Z76.0 MEDICATION REFILL: ICD-10-CM

## 2025-03-05 RX ORDER — MIRTAZAPINE 30 MG/1
TABLET, FILM COATED ORAL
Qty: 30 TABLET | Refills: 5 | Status: SHIPPED | OUTPATIENT
Start: 2025-03-05

## 2025-03-05 RX ORDER — FLUOXETINE HYDROCHLORIDE 40 MG/1
CAPSULE ORAL
Qty: 30 CAPSULE | Refills: 3 | Status: SHIPPED | OUTPATIENT
Start: 2025-03-05

## 2025-03-24 ENCOUNTER — OFFICE VISIT (OUTPATIENT)
Dept: FAMILY MEDICINE CLINIC | Age: 65
End: 2025-03-24
Payer: COMMERCIAL

## 2025-03-24 ENCOUNTER — HOSPITAL ENCOUNTER (OUTPATIENT)
Age: 65
Setting detail: SPECIMEN
Discharge: HOME | End: 2025-03-24

## 2025-03-24 VITALS
TEMPERATURE: 97 F | HEART RATE: 100 BPM | OXYGEN SATURATION: 98 % | SYSTOLIC BLOOD PRESSURE: 125 MMHG | DIASTOLIC BLOOD PRESSURE: 75 MMHG

## 2025-03-24 DIAGNOSIS — R05.9 COUGH, UNSPECIFIED TYPE: ICD-10-CM

## 2025-03-24 DIAGNOSIS — Z76.0 MEDICATION REFILL: ICD-10-CM

## 2025-03-24 DIAGNOSIS — Z13.1 DIABETES MELLITUS SCREENING: ICD-10-CM

## 2025-03-24 DIAGNOSIS — M25.552 LEFT HIP PAIN: Primary | ICD-10-CM

## 2025-03-24 DIAGNOSIS — J30.2 SEASONAL ALLERGIES: ICD-10-CM

## 2025-03-24 DIAGNOSIS — K21.9 GASTROESOPHAGEAL REFLUX DISEASE, UNSPECIFIED WHETHER ESOPHAGITIS PRESENT: ICD-10-CM

## 2025-03-24 LAB
EST. AVERAGE GLUCOSE BLD GHB EST-MCNC: 134 MG/DL
HBA1C MFR BLD: 6.3 % (ref 4–6)

## 2025-03-24 PROCEDURE — G8427 DOCREV CUR MEDS BY ELIG CLIN: HCPCS | Performed by: FAMILY MEDICINE

## 2025-03-24 PROCEDURE — G8417 CALC BMI ABV UP PARAM F/U: HCPCS | Performed by: FAMILY MEDICINE

## 2025-03-24 PROCEDURE — 1036F TOBACCO NON-USER: CPT | Performed by: FAMILY MEDICINE

## 2025-03-24 PROCEDURE — 1090F PRES/ABSN URINE INCON ASSESS: CPT | Performed by: FAMILY MEDICINE

## 2025-03-24 PROCEDURE — G8400 PT W/DXA NO RESULTS DOC: HCPCS | Performed by: FAMILY MEDICINE

## 2025-03-24 PROCEDURE — 1123F ACP DISCUSS/DSCN MKR DOCD: CPT | Performed by: FAMILY MEDICINE

## 2025-03-24 PROCEDURE — 99214 OFFICE O/P EST MOD 30 MIN: CPT | Performed by: FAMILY MEDICINE

## 2025-03-24 PROCEDURE — 3017F COLORECTAL CA SCREEN DOC REV: CPT | Performed by: FAMILY MEDICINE

## 2025-03-24 RX ORDER — ESTRADIOL AND NORETHINDRONE ACETATE 1; .5 MG/1; MG/1
1 TABLET ORAL DAILY
Qty: 112 TABLET | Refills: 0 | Status: SHIPPED | OUTPATIENT
Start: 2025-03-24

## 2025-03-24 RX ORDER — LANSOPRAZOLE 30 MG/1
30 CAPSULE, DELAYED RELEASE ORAL DAILY
Qty: 30 CAPSULE | Refills: 3 | Status: SHIPPED | OUTPATIENT
Start: 2025-03-24

## 2025-03-24 RX ORDER — ALBUTEROL SULFATE 90 UG/1
1 INHALANT RESPIRATORY (INHALATION) EVERY 6 HOURS PRN
Qty: 8.5 G | Refills: 2 | Status: SHIPPED | OUTPATIENT
Start: 2025-03-24

## 2025-03-24 RX ORDER — FLUTICASONE PROPIONATE 50 MCG
1 SPRAY, SUSPENSION (ML) NASAL DAILY
Qty: 2 EACH | Refills: 1 | Status: SHIPPED | OUTPATIENT
Start: 2025-03-24

## 2025-03-24 RX ORDER — MONTELUKAST SODIUM 10 MG/1
10 TABLET ORAL NIGHTLY
Qty: 30 TABLET | Refills: 3 | Status: SHIPPED | OUTPATIENT
Start: 2025-03-24

## 2025-03-24 RX ORDER — FUROSEMIDE 20 MG/1
20 TABLET ORAL DAILY
Qty: 20 TABLET | Refills: 0 | Status: SHIPPED | OUTPATIENT
Start: 2025-03-24

## 2025-03-24 RX ORDER — IBUPROFEN 800 MG/1
TABLET, FILM COATED ORAL
Qty: 60 TABLET | Refills: 0 | Status: SHIPPED | OUTPATIENT
Start: 2025-03-24

## 2025-03-24 SDOH — ECONOMIC STABILITY: FOOD INSECURITY: WITHIN THE PAST 12 MONTHS, YOU WORRIED THAT YOUR FOOD WOULD RUN OUT BEFORE YOU GOT MONEY TO BUY MORE.: PATIENT DECLINED

## 2025-03-24 SDOH — ECONOMIC STABILITY: FOOD INSECURITY: WITHIN THE PAST 12 MONTHS, THE FOOD YOU BOUGHT JUST DIDN'T LAST AND YOU DIDN'T HAVE MONEY TO GET MORE.: PATIENT DECLINED

## 2025-03-24 ASSESSMENT — PATIENT HEALTH QUESTIONNAIRE - PHQ9
2. FEELING DOWN, DEPRESSED OR HOPELESS: NOT AT ALL
SUM OF ALL RESPONSES TO PHQ QUESTIONS 1-9: 0
1. LITTLE INTEREST OR PLEASURE IN DOING THINGS: NOT AT ALL

## 2025-03-24 NOTE — PROGRESS NOTES
her 20s warned of potential mobility issues in later years due to double-jointedness. Lower back pain and occasional difficulty in walking are also reported.    A history of trigger finger is noted, for which injections have been received. Despite treatment, difficulty in bending the affected finger persists.    Review of Systems   Constitutional: Negative for fever and unexpected weight change.   Pertinent items are noted in HPI.    Objective:   Physical Exam  Constitutional: VS (see above).   General appearance: normal development, habitus and attention, no deformities. No distress.  Eyes: normal conjunctiva and lids.  CAV: RRR, no RMG. No edema lower extremities.  Pulmo: CTA bilateral, no CWR.  Skin: no rashes, lesions or ulcers.  Musculoskeletal: normal gait. Nails: no clubbing or cyanosis.  Left foot.  There is a more so soft tissue nontender not swollen no erythema or present just medial to her lateral malleolus.  No discomfort with palpitation.  Psychiatric: alert and oriented to place, time and person. Normal mood and affect.    Assessment:       Diagnosis Orders   1. Left hip pain  XR HIP LEFT (2-3 VIEWS)      2. Seasonal allergies  montelukast (SINGULAIR) 10 MG tablet    albuterol sulfate HFA (PROAIR HFA) 108 (90 Base) MCG/ACT inhaler      3. Medication refill  lansoprazole (PREVACID) 30 MG delayed release capsule    estradiol-norethindrone (ACTIVELLA) 1-0.5 MG per tablet      4. Gastroesophageal reflux disease, unspecified whether esophagitis present  lansoprazole (PREVACID) 30 MG delayed release capsule      5. Cough  albuterol sulfate HFA (PROAIR HFA) 108 (90 Base) MCG/ACT inhaler      6. Diabetes mellitus screening  Hemoglobin A1C          Plan:   The patient has left hip pain just when laying on it.  This could be bursitis but due to patient's anxiety I will order a x-ray.  Discussed with her she needs to make sure to follow-up with the specialist especially orthopedic specialist.  I did call in her

## 2025-03-25 ENCOUNTER — RESULTS FOLLOW-UP (OUTPATIENT)
Dept: FAMILY MEDICINE CLINIC | Age: 65
End: 2025-03-25

## 2025-05-07 ENCOUNTER — TELEPHONE (OUTPATIENT)
Dept: FAMILY MEDICINE CLINIC | Age: 65
End: 2025-05-07

## 2025-05-07 NOTE — TELEPHONE ENCOUNTER
----- Message from Noman GUERRA sent at 5/6/2025  2:55 PM EDT -----  Regarding: [facility] Appointment Request  [facility] Appointment Request    Patient needs appointment for [facility] Appointment Type: ED Follow-Up.    Patient Requested Dates(s):Next Week   Patient Requested Time:Afternoon   Provider Name:Ariana Pineda MD    Reason for Appointment Request: Other, Patient has an appointment on 5/9/25 and office called to reschedule and patient prefer next week afternoon   --------------------------------------------------------------------------------------------------------------------------    Relationship to Patient: Self     Call Back Information: OK to leave message on [employer]  Preferred Call Back Number: Phone

## 2025-05-15 ENCOUNTER — OFFICE VISIT (OUTPATIENT)
Dept: FAMILY MEDICINE CLINIC | Age: 65
End: 2025-05-15
Payer: COMMERCIAL

## 2025-05-15 VITALS
SYSTOLIC BLOOD PRESSURE: 122 MMHG | TEMPERATURE: 97.2 F | OXYGEN SATURATION: 98 % | HEART RATE: 89 BPM | DIASTOLIC BLOOD PRESSURE: 72 MMHG | BODY MASS INDEX: 32.55 KG/M2 | WEIGHT: 214 LBS

## 2025-05-15 DIAGNOSIS — S43.421A SPRAIN OF RIGHT ROTATOR CUFF CAPSULE, INITIAL ENCOUNTER: Primary | ICD-10-CM

## 2025-05-15 PROCEDURE — G8400 PT W/DXA NO RESULTS DOC: HCPCS | Performed by: FAMILY MEDICINE

## 2025-05-15 PROCEDURE — 1090F PRES/ABSN URINE INCON ASSESS: CPT | Performed by: FAMILY MEDICINE

## 2025-05-15 PROCEDURE — 99213 OFFICE O/P EST LOW 20 MIN: CPT | Performed by: FAMILY MEDICINE

## 2025-05-15 PROCEDURE — 3017F COLORECTAL CA SCREEN DOC REV: CPT | Performed by: FAMILY MEDICINE

## 2025-05-15 PROCEDURE — 1036F TOBACCO NON-USER: CPT | Performed by: FAMILY MEDICINE

## 2025-05-15 PROCEDURE — 1123F ACP DISCUSS/DSCN MKR DOCD: CPT | Performed by: FAMILY MEDICINE

## 2025-05-15 PROCEDURE — G8427 DOCREV CUR MEDS BY ELIG CLIN: HCPCS | Performed by: FAMILY MEDICINE

## 2025-05-15 PROCEDURE — G8417 CALC BMI ABV UP PARAM F/U: HCPCS | Performed by: FAMILY MEDICINE

## 2025-05-15 ASSESSMENT — PATIENT HEALTH QUESTIONNAIRE - PHQ9
SUM OF ALL RESPONSES TO PHQ QUESTIONS 1-9: 0
SUM OF ALL RESPONSES TO PHQ QUESTIONS 1-9: 0
1. LITTLE INTEREST OR PLEASURE IN DOING THINGS: NOT AT ALL
SUM OF ALL RESPONSES TO PHQ QUESTIONS 1-9: 0
SUM OF ALL RESPONSES TO PHQ QUESTIONS 1-9: 0
2. FEELING DOWN, DEPRESSED OR HOPELESS: NOT AT ALL

## 2025-05-15 NOTE — PROGRESS NOTES
General FM note    Nadiya Arreola is a 64 y.o. female who presents today for follow up on her  medical conditions as noted below.  Nadiya Arreola is c/o of   Chief Complaint   Patient presents with    Congestive Heart Failure     4/18/25 [facility]       Patient Active Problem List:     Depressed mood     Scarring of lung     Past Medical History:   Diagnosis Date    Back pain     Bowel obstruction (HCC)     Depression       Past Surgical History:   Procedure Laterality Date    HAND SURGERY Left 01/2017    OTHER SURGICAL HISTORY      left ovary and tube removal   age 23    OTHER SURGICAL HISTORY      novasure ablation    PARATHYROIDECTOMY      SPINE SURGERY      THYROIDECTOMY, PARTIAL      WRIST FRACTURE SURGERY       No family history on file.  Current Outpatient Medications   Medication Sig Dispense Refill    ibuprofen (ADVIL;MOTRIN) 800 MG tablet TAKE 1 TABLET BY MOUTH TWICE DAILY AS NEEDED FOR PAIN 60 tablet 0    montelukast (SINGULAIR) 10 MG tablet Take 1 tablet by mouth nightly 30 tablet 3    furosemide (LASIX) 20 MG tablet Take 1 tablet by mouth daily 20 tablet 0    fluticasone (FLONASE) 50 MCG/ACT nasal spray 1 spray by Each Nostril route daily 2 each 1    lansoprazole (PREVACID) 30 MG delayed release capsule Take 1 capsule by mouth daily 30 capsule 3    albuterol sulfate HFA (PROAIR HFA) 108 (90 Base) MCG/ACT inhaler Inhale 1 puff into the lungs every 6 hours as needed for Wheezing 8.5 g 2    estradiol-norethindrone (ACTIVELLA) 1-0.5 MG per tablet Take 1 tablet by mouth daily 112 tablet 0    FLUoxetine (PROZAC) 40 MG capsule TAKE 1 CAPSULE BY MOUTH DAILY 30 capsule 3    mirtazapine (REMERON) 30 MG tablet TAKE 1 TABLET BY MOUTH AT BEDTIME 30 tablet 5    DULoxetine (CYMBALTA) 20 MG extended release capsule Take 1 capsule by mouth daily 30 capsule 3    tiotropium (SPIRIVA HANDIHALER) 18 MCG inhalation capsule Inhale 1 capsule into the lungs daily 30 capsule 3    ibuprofen (ADVIL;MOTRIN) 600

## 2025-05-27 RX ORDER — IBUPROFEN 800 MG/1
800 TABLET, FILM COATED ORAL 2 TIMES DAILY PRN
Qty: 60 TABLET | Refills: 0 | Status: SHIPPED | OUTPATIENT
Start: 2025-05-27

## 2025-06-06 ENCOUNTER — HOSPITAL ENCOUNTER (OUTPATIENT)
Age: 65
Setting detail: SPECIMEN
Discharge: HOME | End: 2025-06-06
Payer: COMMERCIAL

## 2025-06-06 LAB
ANION GAP SERPL CALCULATED.3IONS-SCNC: 10 MMOL/L (ref 9–16)
BUN SERPL-MCNC: 20 MG/DL (ref 8–23)
CALCIUM SERPL-MCNC: 10.1 MG/DL (ref 8.6–10.4)
CHLORIDE SERPL-SCNC: 103 MMOL/L (ref 98–107)
CO2 SERPL-SCNC: 28 MMOL/L (ref 20–31)
CREAT SERPL-MCNC: 1.1 MG/DL (ref 0.6–0.9)
GFR, ESTIMATED: 55 ML/MIN/1.73M2
GLUCOSE SERPL-MCNC: 111 MG/DL (ref 74–99)
POTASSIUM SERPL-SCNC: 4.7 MMOL/L (ref 3.7–5.3)
SODIUM SERPL-SCNC: 141 MMOL/L (ref 136–145)

## 2025-06-06 PROCEDURE — 36415 COLL VENOUS BLD VENIPUNCTURE: CPT

## 2025-06-06 PROCEDURE — 80048 BASIC METABOLIC PNL TOTAL CA: CPT

## 2025-07-14 NOTE — TELEPHONE ENCOUNTER
Nadiya Arreola is calling to request a refill on the following medication(s):    Last Visit Date (If Applicable):  05/15/2025    Next Visit Date:    Visit date not found    Medication Request:  Requested Prescriptions     Pending Prescriptions Disp Refills    ibuprofen (ADVIL;MOTRIN) 800 MG tablet [Pharmacy Med Name: Ibuprofen 800 MG Oral Tablet (IBU)] 60 tablet 0     Sig: TAKE 1 TABLET BY MOUTH TWICE DAILY AS NEEDED FOR PAIN

## 2025-07-15 RX ORDER — IBUPROFEN 800 MG/1
800 TABLET, FILM COATED ORAL 2 TIMES DAILY PRN
Qty: 60 TABLET | Refills: 0 | Status: SHIPPED | OUTPATIENT
Start: 2025-07-15